# Patient Record
Sex: MALE | Race: WHITE | NOT HISPANIC OR LATINO | Employment: FULL TIME | URBAN - METROPOLITAN AREA
[De-identification: names, ages, dates, MRNs, and addresses within clinical notes are randomized per-mention and may not be internally consistent; named-entity substitution may affect disease eponyms.]

---

## 2017-04-01 ENCOUNTER — NON-PROVIDER VISIT (OUTPATIENT)
Dept: OCCUPATIONAL MEDICINE | Facility: CLINIC | Age: 32
End: 2017-04-01
Payer: COMMERCIAL

## 2017-04-01 ENCOUNTER — HOSPITAL ENCOUNTER (EMERGENCY)
Facility: MEDICAL CENTER | Age: 32
End: 2017-04-01
Attending: EMERGENCY MEDICINE
Payer: COMMERCIAL

## 2017-04-01 VITALS
WEIGHT: 240 LBS | HEIGHT: 73 IN | DIASTOLIC BLOOD PRESSURE: 85 MMHG | SYSTOLIC BLOOD PRESSURE: 123 MMHG | BODY MASS INDEX: 31.81 KG/M2 | HEART RATE: 89 BPM | RESPIRATION RATE: 16 BRPM | OXYGEN SATURATION: 95 % | TEMPERATURE: 98.5 F

## 2017-04-01 DIAGNOSIS — Z02.1 PRE-EMPLOYMENT DRUG SCREENING: ICD-10-CM

## 2017-04-01 DIAGNOSIS — Z02.83 ENCOUNTER FOR DRUG SCREENING: ICD-10-CM

## 2017-04-01 DIAGNOSIS — T14.8XXA SPLINTER IN SKIN: ICD-10-CM

## 2017-04-01 PROCEDURE — 80305 DRUG TEST PRSMV DIR OPT OBS: CPT | Performed by: INTERNAL MEDICINE

## 2017-04-01 PROCEDURE — 99283 EMERGENCY DEPT VISIT LOW MDM: CPT

## 2017-04-01 PROCEDURE — 82075 ASSAY OF BREATH ETHANOL: CPT | Performed by: INTERNAL MEDICINE

## 2017-04-01 PROCEDURE — 99026 IN-HOSPITAL ON CALL SERVICE: CPT | Performed by: INTERNAL MEDICINE

## 2017-04-01 ASSESSMENT — LIFESTYLE VARIABLES: DO YOU DRINK ALCOHOL: NO

## 2017-04-01 ASSESSMENT — PAIN SCALES - GENERAL: PAINLEVEL_OUTOF10: 2

## 2017-04-01 NOTE — ED NOTES
Pt ambulated to yellow 65.  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  Small black speck noted to cut from off of table.  ERP to see.

## 2017-04-01 NOTE — ED AVS SNAPSHOT
" Home Care Instructions                                                                                                                Mak Miranda   MRN: [id number]    Department:  [facility], Emergency Dept   Date of Visit:  4/1/2017            [facility], Emergency Dept    [address]    [facility] [address]    Phone:  [phone]      You were seen by     Preston Chin M.D.      Your Diagnosis Was     Splinter in skin     T14.8       Follow-up Information     1. Follow up with Fito Matthews M.D. In 1 day.    Specialty:  Family Medicine    Contact information    [address]  [facility] [address] [phone]        Medication Information     Review all of your home medications and newly ordered medications with your primary doctor and/or pharmacist as soon as possible. Follow medication instructions as directed by your doctor and/or pharmacist.     Please keep your complete medication list with you and share with your physician. Update the information when medications are discontinued, doses are changed, or new medications (including over-the-counter products) are added; and carry medication information at all times in the event of emergency situations.               Medication List      ASK your doctor about these medications        Instructions    Morning Afternoon Evening Bedtime    oxycodone immediate-release 5 MG Tabs   Commonly known as:  ROXICODONE        Take 2 Tabs by mouth every 3 hours as needed for Severe Pain.   Dose:  10 mg                                  Discharge Instructions       Foreign Body  When the skin is cut or punctured and some object is left in the tissue under the skin, that object is called a \"foreign body\". A foreign body could be a wood splinter, a thorn, a sliver of metal, a shard of glass, a cactus needle or the tip of a pencil. In most instances, your caregiver will recommend that the foreign body be removed. If it " is not removed, infection, abscess formation, an allergic reaction, chronic pain and disability can occur over time.  Sometimes, foreign bodies (particularly very small ones) can be difficult to locate. Your caregiver may recommend x-rays or ultrasound imaging to help find them. If removal is not successful, there may be a need to see a surgeon who might suggest further exploration in the operating room. Occasionally, tiny bits of metallic foreign material (such as shrapnel) are not removed, if it is felt that there would be no harm in leaving them untouched.  HOME CARE INSTRUCTIONS  · Rest the injured area and keep it elevated until all the pain and swelling are gone.   · You will need a tetanus vaccination if you have not had one in the last 5 years.   · Return to this facility, see your caregiver or follow-up as instructed in 2 days.   SEEK IMMEDIATE MEDICAL CARE IF:   · You develop increasing redness or swelling of the skin near the wound.   · You develop drainage of pus from the wound.   · You have persistent pain or loss of motion.   · You have red streaks extending above or below the wound location.   MAKE SURE YOU:   · Understand these instructions.   · Will watch your condition.   · Will get help right away if you are not doing well or get worse.   Document Released: 12/18/2006 Document Revised: 03/11/2013 Document Reviewed: 11/19/2010  ExitCare® Patient Information ©2013 [employer].          Patient Information     Patient Information    Following emergency treatment: all patient requiring follow-up care must return either to a private physician or a clinic if your condition worsens before you are able to obtain further medical attention, please return to the emergency room.     Billing Information    At [facility], we work to make the billing process streamlined for our patients.  Our Representatives are here to answer any questions you may have regarding your hospital bill.  If you have insurance  coverage and have supplied your insurance information to us, we will submit a claim to your insurer on your behalf.  Should you have any questions regarding your bill, we can be reached online or by phone as follows:  Online: You are able pay your bills online or live chat with our representatives about any billing questions you may have. We are here to help Monday - Friday from 8:00am to 7:30pm and 9:00am - 12:00pm on Saturdays.  Please visit [email or web address]  for more information.   Phone:  [phone] or [phone]    Please note that your emergency physician, surgeon, pathologist, radiologist, anesthesiologist, and other specialists are not employed by [facility] and will therefore bill separately for their services.  Please contact them directly for any questions concerning their bills at the numbers below:     Emergency Physician Services:  [phone]  [facility]:  [phone]  [facility]:  [phone]  [facility]:  [phone]    1. Your final bill may vary from the amount quoted upon discharge if all procedures are not complete at that time, or if your doctor has additional procedures of which we are not aware. You will receive an additional bill if you return to the Emergency Department at [facility] for suture removal regardless of the facility of which the sutures were placed.     2. Please arrange for settlement of this account at the emergency registration.    3. All self-pay accounts are due in full at the time of treatment.  If you are unable to meet this obligation then payment is expected within 4-5 days.     4. If you have had radiology studies (CT, X-ray, Ultrasound, MRI), you have received a preliminary result during your emergency department visit. Please contact the radiology department [phone] to receive a copy of your final result. Please discuss the Final result with your primary  physician or with the follow up physician provided.     Crisis Hotline:  [place] Crisis Hotline:  2-659-BFNMTQW or 1-436.133.3854  [place] Crisis Hotline:    1-460.118.8697 or 237-622-8907         ED Discharge Follow Up Questions    1. In order to provide you with very good care, we would like to follow up with a phone call in the next few days.  May we have your permission to contact you?     YES /  NO    2. What is the best phone number to call you? (       )_____-__________    3. What is the best time to call you?      Morning  /  Afternoon  /  Evening                   Patient Signature:  ____________________________________________________________    Date:  ____________________________________________________________

## 2017-04-01 NOTE — ED NOTES
Discharge instructions given.  All questions answered.  Pt to follow-up with PCP.  Pt verbalized understanding.  All belongings with pt.  Pt ambulated to lobby.

## 2017-04-01 NOTE — LETTER
"  FORM C-4:  EMPLOYEE’S CLAIM FOR COMPENSATION/ REPORT OF INITIAL TREATMENT  EMPLOYEE’S CLAIM - PROVIDE ALL INFORMATION REQUESTED   First Name  Mak Last Name  Ruth Birthdate             Age  1985 31 y.o. 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 (if applicable)  Cleaning the equiptment    How did this injury or occupational disease occur? Be specific and answer in detail. Use additional sheet if necessary)  I was cleaning the footboard of the ct table with gloves and a piece of the table went through my gloves and into my middle finger   If you believe that you have an occupational disease, when did you first have knowledge of the disability and it relationship to your employment?  NA Witnesses to the Accident  NA     Nature of Injury or Occupational Disease  Puncture  Part(s) of Body Injured or Affected  Finger (R), N/A, N/A    I certify that the above is true and correct to the best of my knowledge and that I have provided this information in order to " obtain the benefits of [place]).  I hereby authorize any physician, chiropractor, surgeon, practitioner, or other person, any hospital, including [facility] or [facility] hospital, any medical service organization, any insurance company, or other institution or organization to release to each other, any medical or other information, including benefits paid or payable, pertinent to this injury or disease, except information relative to diagnosis, treatment and/or counseling for AIDS, psychological conditions, alcohol or controlled substances, for which I must give specific authorization.  A Photostat of this authorization shall be as valid as the original.   Date  04/01/2017 [facility] Employee’s Signature   THIS REPORT MUST BE COMPLETED AND MAILED WITHIN 3 WORKING DAYS OF TREATMENT   Place  [facility], EMERGENCY DEPT  Name of Facility   [facility]   Date  4/1/2017 Diagnosis  (T14.8) Splinter in skin Is there evidence the injured employee was under the influence of alcohol and/or another controlled substance at the time of accident?   Hour  1:39 PM Description of Injury or Disease  Splinter in skin No   Treatment  Foreign body removal from skin  Have you advised the patient to remain off work five days or more?         No   X-Ray Findings    Comments:not indicated   If Yes   From Date    To Date      From information given by the employee, together with medical evidence, can you directly connect this injury or occupational disease as job incurred?  Yes If No, is the employee capable of: Full Duty  Yes Modified Duty      Is additional medical care by a physician indicated?  No If Modified Duty, Specify any Limitations / Restrictions        Do you know of any previous injury or disease contributing to this condition or occupational  "disease?  No   Date  4/1/2017 Print Doctor’s Name  Preston Chin certify the employer’s copy of this form was mailed on:   Address  [address] [address] [phone] Insurer’s Use Only   [facility]  [address]    Provider’s Tax ID Number    Telephone  Dept: [phone]    Doctor’s Signature  e-SignPRESTON CHIN M.D. Degree   MD    Original - TREATING PHYSICIAN OR CHIROPRACTOR   Pg 2-Insurer/TPA   Pg 3-Employer   Pg 4-Employee                                                                                                  Form C-4 (rev01/03)     BRIEF DESCRIPTION OF RIGHTS AND BENEFITS  ([place]ursuant to [place] 616[place])    Notice of Injury or Occupational Disease (Incident Report Form C-1): If an injury or occupational disease (OD) arises out of and in the course of employment, you must provide written notice to your employer as soon as practicable, but no later than 7 days after the accident or OD. Your employer shall maintain a sufficient supply of the required forms.    Claim for Compensation (Form C-4): If medical treatment is sought, the form C-4 is available at the place of initial treatment. A completed \"Claim for Compensation\" (Form C-4) must be filed within 90 days after an accident or OD. The treating physician or chiropractor must, within 3 working days after treatment, complete and mail to the employer, the employer's insurer and third-party , the Claim for Compensation.    Medical Treatment: If you require medical treatment for your on-the-job injury or OD, you may be required to select a physician or chiropractor from a list provided by your workers’ compensation insurer, if it has contracted with an Organization for Managed Care (MCO) or Preferred Provider Organization (PPO) or providers of health care. If your employer has not entered into a contract with an MCO or PPO, you may select a physician or chiropractor from the Panel of Physicians and " Chiropractors. Any medical costs related to your industrial injury or OD will be paid by your insurer.    Temporary Total Disability (TTD): If your doctor has certified that you are unable to work for a period of at least 5 consecutive days, or 5 cumulative days in a 20-day period, or places restrictions on you that your employer does not accommodate, you may be entitled to TTD compensation.    Temporary Partial Disability (TPD): If the wage you receive upon reemployment is less than the compensation for TTD to which you are entitled, the insurer may be required to pay you TPD compensation to make up the difference. TPD can only be paid for a maximum of 24 months.    Permanent Partial Disability (PPD): When your medical condition is stable and there is an indication of a PPD as a result of your injury or OD, within 30 days, your insurer must arrange for an evaluation by a rating physician or chiropractor to determine the degree of your PPD. The amount of your PPD award depends on the date of injury, the results of the PPD evaluation and your age and wage.    Permanent Total Disability (PTD): If you are medically certified by a treating physician or chiropractor as permanently and totally disabled and have been granted a PTD status by your insurer, you are entitled to receive monthly benefits not to exceed 66 2/3% of your average monthly wage. The amount of your PTD payments is subject to reduction if you previously received a PPD award.    Vocational Rehabilitation Services: You may be eligible for vocational rehabilitation services if you are unable to return to the job due to a permanent physical impairment or permanent restrictions as a result of your injury or occupational disease.    Transportation and Per Ludwig Reimbursement: You may be eligible for travel expenses and per ludwig associated with medical treatment.  Reopening: You may be able to reopen your claim if your condition worsens after claim  closure.    Appeal Process: If you disagree with a written determination issued by the insurer or the insurer does not respond to your request, you may appeal to the Department of Administration, , by following the instructions contained in your determination letter. You must appeal the determination within 70 days from the date of the determination letter at [address], [address], [place], [place] [address], or [address], [address], [place], [place] [address]. If you disagree with the  decision, you may appeal to the Department of Administration, . You must file your appeal within 30 days from the date of the  decision letter at [address], [address], [place], [place] [address], or [address], [place], [place] [address]. If you disagree with a decision of an , you may file a petition for judicial review with the District Court. You must do so within 30 days of the Appeal Officer’s decision. You may be represented by an  at your own expense or you may contact the [facility] for possible representation.    [place]  for Injured Workers (NAIW): If you disagree with a  decision, you may request that NAIW represent you without charge at an  Hearing. For information regarding denial of benefits, you may contact the [facility] at: [address], [address], [place] [address], [phone], or [address], [address], [place] [address], [phone]    To File a Complaint with the Division: If you wish to file a complaint with the  of the Division of Industrial Relations (DIR), please contact the Workers’ Compensation Section, [address], [address], [place], [place] [address], telephone [phone], or [address], [place] [address], telephone [phone].    For assistance with  Workers’ Compensation Issues: you may contact the Office of the Governor Consumer Health Assistance, [address] [place] [address], [place] [address], [place] [address], Toll Free 1-838.713.7767, Web site: http://govcha.[facility].nv., E-mail [email or web address]@[email or web address] [email or web address].[email or web address].                                                                                                                                                                               __________________________________________________________________                                    _________________            Employee Name / Signature                                                                                                                            Date                                       D-2 (rev. 10/07)

## 2017-04-01 NOTE — ED PROVIDER NOTES
"ED Provider Note    CHIEF COMPLAINT  Chief Complaint   Patient presents with   • Work-Related Injury     small cut to right hand middle finger       HPI  Mak Miranda is a 31 y.o. 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 No fever, no numbness.    REVIEW OF SYSTEMS  Negative for fever, numbness.    PAST MEDICAL HISTORY   has a past medical history of Anxiety; Hypertension; Pain; Bipolar 1 disorder, mixed (CMS-HCC) (7/1/2014); Oppositional defiant disorder; and Arthritis.    SOCIAL HISTORY  Social History     Social History Main Topics   • Smoking status: Never Smoker    • Smokeless tobacco: Never Used      Comment: Pipe once a month   • Alcohol Use: 0.6 oz/week     1 Cans of beer per week      Comment: 1 drink per week   • Drug Use: Yes     Special: Marijuana      Comment: tablets, medical THC   • Sexual Activity:     Partners: Female       SURGICAL HISTORY   has past surgical history that includes arthroscopy, knee (1992); other orthopedic surgery (2005); and knee arthroscopy (Left, 11/23/2016).    CURRENT MEDICATIONS  I personally reviewed the medication list in the charting documentation.     ALLERGIES  Allergies   Allergen Reactions   • Sulfa Drugs      As infant; doesn't remember rxn       PHYSICAL EXAM  VITAL SIGNS: /84 mmHg  Pulse 96  Temp(Src) 36.2 °C (97.1 °F)  Resp 16  Ht 1.854 m (6' 0.99\")  Wt 108.863 kg (240 lb)  BMI 31.67 kg/m2  SpO2 95%  Constitutional: Alert in no apparent distress.  HENT: No signs of trauma.   Eyes: Conjunctiva normal, Non-icteric.   Chest: Normal nonlabored respirations  Skin: Right middle finger has a small sliver of a black foreign body, no obvious laceration, no erythema  Musculoskeletal: Good range of motion in all major joints.   Neurologic: Alert, No focal deficits noted.   Psychiatric: Affect normal, " Judgment normal.    COURSE & MEDICAL DECISION MAKING  Pertinent Labs & Imaging studies reviewed. (See chart for details)    Encounter Summary: This is a 31 y.o. male with body under the skin of his finger, this was removed in emergency department, his tetanus is up-to-date. Stable for discharge      DISPOSITION: Discharge Home      FINAL IMPRESSION  1. Splinter in skin        This dictation was created using voice recognition software. The accuracy of the dictation is limited to the abilities of the software. I expect there may be some errors of grammar and possibly content. The nursing notes were reviewed and certain aspects of this information were incorporated into this note.    Electronically signed by: Preston Chin, 4/1/2017 1:39 PM

## 2017-04-01 NOTE — ED AVS SNAPSHOT
4/1/2017          Mak Miranda  [address] [place] [address]    Dear Mak:    [facility] wants to ensure your discharge home is safe and you or your loved ones have had all your questions answered regarding your care after you leave the hospital.    You may receive a telephone call within two days of your discharge.  This call is to make certain you understand your discharge instructions as well as ensure we provided you with the best care possible during your stay with us.     The call will only last approximately 3-5 minutes and will be done by a nurse.    Once again, we want to ensure your discharge home is safe and that you have a clear understanding of any next steps in your care.  If you have any questions or concerns, please do not hesitate to contact us, we are here for you.  Thank you for choosing [facility] for your healthcare needs.    Sincerely,    Asim Fry    [facility]

## 2017-04-01 NOTE — MR AVS SNAPSHOT
Mak Miranda   2017 2:20 PM   Appointment   MRN: [id number]    Department:  [facility]   Dept Phone:  [phone]    Description:  Male : 1985   Provider:  [place] NON RENOWN MA           Allergies as of 2017     Allergen Noted Reactions    Sulfa Drugs 2014       As infant; doesn't remember rxn      Vital Signs     Smoking Status                   Never Smoker            Basic Information     Date Of Birth Sex Race Ethnicity Preferred Language    1985 Male White Non- English      Problem List              ICD-10-CM Priority Class Noted - Resolved    GERD (gastroesophageal reflux disease) K21.9   2014 - Present    Hyperlipidemia E78.5   2014 - Present    Obesity (BMI 30.0-34.9) E66.9   2014 - Present    Right elbow pain M25.521   10/6/2015 - Present    Epigastric pain R10.13   2016 - Present    Chronic fatigue R53.82   2016 - Present    Generalized anxiety disorder F41.1   2016 - Present    CLARE (generalized anxiety disorder) F41.1   10/4/2016 - Present    Obsessive compulsive disorder F42.9   10/4/2016 - Present    Left knee pain M25.562   2016 - Present      Health Maintenance        Date Due Completion Dates    IMM DTaP/Tdap/Td Vaccine (1 - Tdap) 6/3/2004 ---            Current Immunizations     Influenza Vaccine Quad Inj (Preserved) 2015, 2014    MMR Vaccine 1986    Tuberculin Skin Test 2014 12:25 PM, 2014 10:27 AM    Varicella Vaccine Live 5/15/1991      Below and/or attached are the medications your provider expects you to take. Review all of your home medications and newly ordered medications with your provider and/or pharmacist. Follow medication instructions as directed by your provider and/or pharmacist. Please keep your medication list with you and share with your provider. Update the information when medications are discontinued, doses are changed, or new medications (including over-the-counter  products) are added; and carry medication information at all times in the event of emergency situations     Allergies:  SULFA DRUGS - (reactions not documented)               Medications  Valid as of: April 05, 2017 - 10:20 AM    Generic Name Brand Name Tablet Size Instructions for use    OxyCODONE HCl (Tab) ROXICODONE 5 MG Take 2 Tabs by mouth every 3 hours as needed for Severe Pain.        .                 Medicines prescribed today were sent to:     [facility][id number] - [place] - [address] [place] [address]    Phone: [phone] Fax: [phone]    Open 24 Hours?: No      Medication refill instructions:       If your prescription bottle indicates you have medication refills left, it is not necessary to call your provider’s office. Please contact your pharmacy and they will refill your medication.    If your prescription bottle indicates you do not have any refills left, you may request refills at any time through one of the following ways: The online GoFormz system (except Urgent Care), by calling your provider’s office, or by asking your pharmacy to contact your provider’s office with a refill request. Medication refills are processed only during regular business hours and may not be available until the next business day. Your provider may request additional information or to have a follow-up visit with you prior to refilling your medication.   *Please Note: Medication refills are assigned a new Rx number when refilled electronically. Your pharmacy may indicate that no refills were authorized even though a new prescription for the same medication is available at the pharmacy. Please request the medicine by name with the pharmacy before contacting your provider for a refill.           GoFormz Access Code: Activation code not generated  Current GoFormz Status: Active

## 2017-04-01 NOTE — ED AVS SNAPSHOT
JavaJobs Access Code: Activation code not generated  Current JavaJobs Status: Active    Uzabasehart  A secure, online tool to manage your health information     [employer]’s JavaJobs® is a secure, online tool that connects you to your personalized health information from the privacy of your home -- day or night - making it very easy for you to manage your healthcare. Once the activation process is completed, you can even access your medical information using the JavaJobs ernie, which is available for free in the Apple Ernie store or Google Play store.     JavaJobs provides the following levels of access (as shown below):   My Chart Features   [facility] Primary Care Doctor [facility]  Specialists [facility]  Urgent  Care [facility]  Primary Care  Doctor   Email your healthcare team securely and privately 24/7 X X X X   Manage appointments: schedule your next appointment; view details of past/upcoming appointments X      Request prescription refills. X      View recent personal medical records, including lab and immunizations X X X X   View health record, including health history, allergies, medications X X X X   Read reports about your outpatient visits, procedures, consult and ER notes X X X X   See your discharge summary, which is a recap of your hospital and/or ER visit that includes your diagnosis, lab results, and care plan. X X       How to register for JavaJobs:  1. Go to  [email or web address].  2. Click on the Sign Up Now box, which takes you to the New Member Sign Up page. You will need to provide the following information:  a. Enter your JavaJobs Access Code exactly as it appears at the top of this page. (You will not need to use this code after you’ve completed the sign-up process. If you do not sign up before the expiration date, you must request a new code.)   b. Enter your date of birth.   c. Enter your home email address.   d. Click Submit, and follow the next screen’s instructions.  3. Create a JavaJobs ID. This will  be your [employer] login ID and cannot be changed, so think of one that is secure and easy to remember.  4. Create a Cobook password. You can change your password at any time.  5. Enter your Password Reset Question and Answer. This can be used at a later time if you forget your password.   6. Enter your e-mail address. This allows you to receive e-mail notifications when new information is available in Cobook.  7. Click Sign Up. You can now view your health information.    For assistance activating your Cobook account, call [phone]

## 2017-04-05 LAB
AMP AMPHETAMINE: NORMAL
BAR BARBITURATES: NORMAL
BREATH ALCOHOL COMMENT: NORMAL
BZO BENZODIAZEPINES: NORMAL
COC COCAINE: NORMAL
INT CON NEG: NORMAL
INT CON POS: NORMAL
MDMA ECSTASY: NORMAL
MET METHAMPHETAMINES: NORMAL
MTD METHADONE: NORMAL
OPI OPIATES: NORMAL
OXY OXYCODONE: NORMAL
PCP PHENCYCLIDINE: NORMAL
POC BREATHALIZER: 0 PERCENT (ref 0–0.01)
POC URINE DRUG SCREEN OCDRS: NEGATIVE
THC: NORMAL

## 2017-04-05 NOTE — PROGRESS NOTES
Elidia was called out After business hours to ER for a Post Accident UDS and BAT on 4/1/17 for [facility].    UDS collected at 215pm.  BAT collected at 209pm.

## 2017-05-05 ENCOUNTER — HOSPITAL ENCOUNTER (EMERGENCY)
Facility: MEDICAL CENTER | Age: 32
End: 2017-05-05
Attending: EMERGENCY MEDICINE
Payer: COMMERCIAL

## 2017-05-05 ENCOUNTER — PATIENT MESSAGE (OUTPATIENT)
Dept: MEDICAL GROUP | Facility: MEDICAL CENTER | Age: 32
End: 2017-05-05

## 2017-05-05 ENCOUNTER — TELEPHONE (OUTPATIENT)
Dept: MEDICAL GROUP | Facility: MEDICAL CENTER | Age: 32
End: 2017-05-05

## 2017-05-05 VITALS
SYSTOLIC BLOOD PRESSURE: 131 MMHG | TEMPERATURE: 97.8 F | HEIGHT: 73 IN | BODY MASS INDEX: 32.9 KG/M2 | HEART RATE: 84 BPM | WEIGHT: 248.24 LBS | DIASTOLIC BLOOD PRESSURE: 86 MMHG | RESPIRATION RATE: 17 BRPM | OXYGEN SATURATION: 98 %

## 2017-05-05 DIAGNOSIS — S01.81XA LACERATION OF FACE, INITIAL ENCOUNTER: ICD-10-CM

## 2017-05-05 PROCEDURE — 90471 IMMUNIZATION ADMIN: CPT

## 2017-05-05 PROCEDURE — 700101 HCHG RX REV CODE 250: Performed by: EMERGENCY MEDICINE

## 2017-05-05 PROCEDURE — 700111 HCHG RX REV CODE 636 W/ 250 OVERRIDE (IP): Performed by: EMERGENCY MEDICINE

## 2017-05-05 PROCEDURE — 90715 TDAP VACCINE 7 YRS/> IM: CPT | Performed by: EMERGENCY MEDICINE

## 2017-05-05 PROCEDURE — 99283 EMERGENCY DEPT VISIT LOW MDM: CPT

## 2017-05-05 PROCEDURE — A9270 NON-COVERED ITEM OR SERVICE: HCPCS | Performed by: EMERGENCY MEDICINE

## 2017-05-05 PROCEDURE — 700102 HCHG RX REV CODE 250 W/ 637 OVERRIDE(OP): Performed by: EMERGENCY MEDICINE

## 2017-05-05 PROCEDURE — 304217 HCHG IRRIGATION SYSTEM

## 2017-05-05 PROCEDURE — 303747 HCHG EXTRA SUTURE

## 2017-05-05 PROCEDURE — 304999 HCHG REPAIR-SIMPLE/INTERMED LEVEL 1

## 2017-05-05 RX ORDER — LIDOCAINE HYDROCHLORIDE 10 MG/ML
20 INJECTION, SOLUTION INFILTRATION; PERINEURAL ONCE
Status: COMPLETED | OUTPATIENT
Start: 2017-05-05 | End: 2017-05-05

## 2017-05-05 RX ORDER — TRAMADOL HYDROCHLORIDE 50 MG/1
100 TABLET ORAL ONCE
Status: COMPLETED | OUTPATIENT
Start: 2017-05-05 | End: 2017-05-05

## 2017-05-05 RX ADMIN — TRAMADOL HYDROCHLORIDE 100 MG: 50 TABLET, COATED ORAL at 02:42

## 2017-05-05 RX ADMIN — CLOSTRIDIUM TETANI TOXOID ANTIGEN (FORMALDEHYDE INACTIVATED), CORYNEBACTERIUM DIPHTHERIAE TOXOID ANTIGEN (FORMALDEHYDE INACTIVATED), BORDETELLA PERTUSSIS TOXOID ANTIGEN (GLUTARALDEHYDE INACTIVATED), BORDETELLA PERTUSSIS FILAMENTOUS HEMAGGLUTININ ANTIGEN (FORMALDEHYDE INACTIVATED), BORDETELLA PERTUSSIS PERTACTIN ANTIGEN, AND BORDETELLA PERTUSSIS FIMBRIAE 2/3 ANTIGEN 0.5 ML: 5; 2; 2.5; 5; 3; 5 INJECTION, SUSPENSION INTRAMUSCULAR at 02:25

## 2017-05-05 RX ADMIN — LIDOCAINE HYDROCHLORIDE 20 ML: 10 INJECTION, SOLUTION INFILTRATION; PERINEURAL at 01:08

## 2017-05-05 ASSESSMENT — PAIN SCALES - GENERAL: PAINLEVEL_OUTOF10: 6

## 2017-05-05 NOTE — ED AVS SNAPSHOT
[employer] Access Code: Activation code not generated  Current [employer] Status: Active    E-[employer]K Dynamicshart  A secure, online tool to manage your health information     [employer]’s [employer]® is a secure, online tool that connects you to your personalized health information from the privacy of your home -- day or night - making it very easy for you to manage your healthcare. Once the activation process is completed, you can even access your medical information using the [employer] ernie, which is available for free in the Apple Ernie store or Google Play store.     [employer] provides the following levels of access (as shown below):   My Chart Features   [facility] Primary Care Doctor [facility]  Specialists [facility]  Urgent  Care Non-[facility]  Primary Care  Doctor   Email your healthcare team securely and privately 24/7 X X X X   Manage appointments: schedule your next appointment; view details of past/upcoming appointments X      Request prescription refills. X      View recent personal medical records, including lab and immunizations X X X X   View health record, including health history, allergies, medications X X X X   Read reports about your outpatient visits, procedures, consult and ER notes X X X X   See your discharge summary, which is a recap of your hospital and/or ER visit that includes your diagnosis, lab results, and care plan. X X       How to register for [employer]:  1. Go to  [email or web address].  2. Click on the Sign Up Now box, which takes you to the New Member Sign Up page. You will need to provide the following information:  a. Enter your [employer] Access Code exactly as it appears at the top of this page. (You will not need to use this code after you’ve completed the sign-up process. If you do not sign up before the expiration date, you must request a new code.)   b. Enter your date of birth.   c. Enter your home email address.   d. Click Submit, and follow the next screen’s instructions.  3. Create a [employer] ID. This will  be your "[employer]" login ID and cannot be changed, so think of one that is secure and easy to remember.  4. Create a "[employer]" password. You can change your password at any time.  5. Enter your Password Reset Question and Answer. This can be used at a later time if you forget your password.   6. Enter your e-mail address. This allows you to receive e-mail notifications when new information is available in "[employer]".  7. Click Sign Up. You can now view your health information.    For assistance activating your "[employer]" account, call [phone]

## 2017-05-05 NOTE — TELEPHONE ENCOUNTER
For a fall and suture placement, narcotic pain medication is not indicated or recommended.  Patient can use ibuprofen 800 mg up to 3 times daily and then alternate that with Tylenol 1000 mg up to 3 times daily. He can also use topical ice.  Pain should improve in next few days.  Fito Matthews M.D.

## 2017-05-05 NOTE — ED AVS SNAPSHOT
5/5/2017    Mak Miranda  [address] [facility] [address] [facility] [address]    Dear Mak:    [facility] wants to ensure your discharge home is safe and you or your loved ones have had all of your questions answered regarding your care after you leave the hospital.    Below is a list of resources and contact information should you have any questions regarding your hospital stay, follow-up instructions, or active medical symptoms.    Questions or Concerns Regarding… Contact   Medical Questions Related to Your Discharge  (7 days a week, 8am-5pm) Contact a Nurse Care Coordinator   [phone]   Medical Questions Not Related to Your Discharge  (24 hours a day / 7 days a week)  Contact the Nurse Health Line   [phone]    Medications or Discharge Instructions Refer to your discharge packet   or contact your [facility] Primary Care Provider   [phone]   Follow-up Appointment(s) Schedule your appointment via [employer]   or contact Scheduling [phone]   Billing Review your statement via [employer]  or contact Billing [phone]   Medical Records Review your records via [employer]   or contact Medical Records [phone]     You may receive a telephone call within two days of discharge. This call is to make certain you understand your discharge instructions and have the opportunity to have any questions answered. You can also easily access your medical information, test results and upcoming appointments via the [employer] free online health management tool. You can learn more and sign up at Spayee/[employer]. For assistance setting up your [employer] account, please call [phone].    Once again, we want to ensure your discharge home is safe and that you have a clear understanding of any next steps in your care. If you have any questions or concerns, please do not hesitate to contact us, we are here for you. Thank you for choosing [facility] for your healthcare needs.    Sincerely,    Your [facility] Healthcare Team

## 2017-05-05 NOTE — DISCHARGE INSTRUCTIONS
Facial Laceration   A facial laceration is a cut on the face. These injuries can be painful and cause bleeding. Lacerations usually heal quickly, but they need special care to reduce scarring.  DIAGNOSIS   Your health care provider will take a medical history, ask for details about how the injury occurred, and examine the wound to determine how deep the cut is.  TREATMENT   Some facial lacerations may not require closure. Others may not be able to be closed because of an increased risk of infection. The risk of infection and the chance for successful closure will depend on various factors, including the amount of time since the injury occurred.  The wound may be cleaned to help prevent infection. If closure is appropriate, pain medicines may be given if needed. Your health care provider will use stitches (sutures), wound glue (adhesive), or skin adhesive strips to repair the laceration. These tools bring the skin edges together to allow for faster healing and a better cosmetic outcome. If needed, you may also be given a tetanus shot.  HOME CARE INSTRUCTIONS  · Only take over-the-counter or prescription medicines as directed by your health care provider.  · Follow your health care provider's instructions for wound care. These instructions will vary depending on the technique used for closing the wound.  For Sutures:  · Keep the wound clean and dry.    · If you were given a bandage (dressing), you should change it at least once a day. Also change the dressing if it becomes wet or dirty, or as directed by your health care provider.    · Wash the wound with soap and water 2 times a day. Rinse the wound off with water to remove all soap. Pat the wound dry with a clean towel.    · After cleaning, apply a thin layer of the antibiotic ointment recommended by your health care provider. This will help prevent infection and keep the dressing from sticking.    · You may shower as usual after the first 24 hours. Do not soak the  wound in water until the sutures are removed.    · Get your sutures removed as directed by your health care provider. With facial lacerations, sutures should usually be taken out after 4-5 days to avoid stitch marks.    · Wait a few days after your sutures are removed before applying any makeup.  For Skin Adhesive Strips:  · Keep the wound clean and dry.    · Do not get the skin adhesive strips wet. You may bathe carefully, using caution to keep the wound dry.    · If the wound gets wet, pat it dry with a clean towel.    · Skin adhesive strips will fall off on their own. You may trim the strips as the wound heals. Do not remove skin adhesive strips that are still stuck to the wound. They will fall off in time.    For Wound Adhesive:  · You may briefly wet your wound in the shower or bath. Do not soak or scrub the wound. Do not swim. Avoid periods of heavy sweating until the skin adhesive has fallen off on its own. After showering or bathing, gently pat the wound dry with a clean towel.    · Do not apply liquid medicine, cream medicine, ointment medicine, or makeup to your wound while the skin adhesive is in place. This may loosen the film before your wound is healed.    · If a dressing is placed over the wound, be careful not to apply tape directly over the skin adhesive. This may cause the adhesive to be pulled off before the wound is healed.    · Avoid prolonged exposure to sunlight or tanning lamps while the skin adhesive is in place.  · The skin adhesive will usually remain in place for 5-10 days, then naturally fall off the skin. Do not pick at the adhesive film.    After Healing:  Once the wound has healed, cover the wound with sunscreen during the day for 1 full year. This can help minimize scarring. Exposure to ultraviolet light in the first year will darken the scar. It can take 1-2 years for the scar to lose its redness and to heal completely.   SEEK MEDICAL CARE IF:  · You have a fever.  SEEK IMMEDIATE  MEDICAL CARE IF:  · You have redness, pain, or swelling around the wound.    · You see a yellowish-white fluid (pus) coming from the wound.       This information is not intended to replace advice given to you by your health care provider. Make sure you discuss any questions you have with your health care provider.     Document Released: 01/25/2006 Document Revised: 01/08/2016 Document Reviewed: 07/31/2014  [employer] Interactive Patient Education ©2016 [employer]

## 2017-05-05 NOTE — ED AVS SNAPSHOT
Home Care Instructions                                                                                                                Mak Miranda   MRN: [id number]    Department:  [facility], Emergency Dept   Date of Visit:  5/5/2017            [facility], Emergency Dept    [address] [place] [address]    Phone:  [phone]      You were seen by     Natanael Thompson M.D.      Your Diagnosis Was     Laceration of face, initial encounter     S01.81XA       These are the medications you received during your hospitalization from 05/05/2017 0053 to 05/05/2017 0230     Date/Time Order Dose Route Action    05/05/2017 0225 tetanus-dipth-acell pertussis (ADACEL) inj 0.5 mL 0.5 mL Intramuscular Given      Follow-up Information     1. Follow up with [facility], Emergency Dept In 5 days.    Specialty:  Emergency Medicine    Why:  For suture removal, For wound re-check    Contact information    [address] [place] [id number] [phone]        2. Follow up with [facility], Emergency Dept.    Specialty:  Emergency Medicine    Why:  if symptoms persist    Contact information    [address] [place] [address] [phone]      Medication Information     Review all of your home medications and newly ordered medications with your primary doctor and/or pharmacist as soon as possible. Follow medication instructions as directed by your doctor and/or pharmacist.     Please keep your complete medication list with you and share with your physician. Update the information when medications are discontinued, doses are changed, or new medications (including over-the-counter products) are added; and carry medication information at all times in the event of emergency situations.               Medication List      ASK your doctor about these medications        Instructions    Morning Afternoon Evening Bedtime    oxycodone  immediate-release 5 MG Tabs   Commonly known as:  ROXICODONE        Take 2 Tabs by mouth every 3 hours as needed for Severe Pain.   Dose:  10 mg                                  Discharge Instructions       Facial Laceration   A facial laceration is a cut on the face. These injuries can be painful and cause bleeding. Lacerations usually heal quickly, but they need special care to reduce scarring.  DIAGNOSIS   Your health care provider will take a medical history, ask for details about how the injury occurred, and examine the wound to determine how deep the cut is.  TREATMENT   Some facial lacerations may not require closure. Others may not be able to be closed because of an increased risk of infection. The risk of infection and the chance for successful closure will depend on various factors, including the amount of time since the injury occurred.  The wound may be cleaned to help prevent infection. If closure is appropriate, pain medicines may be given if needed. Your health care provider will use stitches (sutures), wound glue (adhesive), or skin adhesive strips to repair the laceration. These tools bring the skin edges together to allow for faster healing and a better cosmetic outcome. If needed, you may also be given a tetanus shot.  HOME CARE INSTRUCTIONS  · Only take over-the-counter or prescription medicines as directed by your health care provider.  · Follow your health care provider's instructions for wound care. These instructions will vary depending on the technique used for closing the wound.  For Sutures:  · Keep the wound clean and dry.    · If you were given a bandage (dressing), you should change it at least once a day. Also change the dressing if it becomes wet or dirty, or as directed by your health care provider.    · Wash the wound with soap and water 2 times a day. Rinse the wound off with water to remove all soap. Pat the wound dry with a clean towel.    · After cleaning, apply a thin layer of  the antibiotic ointment recommended by your health care provider. This will help prevent infection and keep the dressing from sticking.    · You may shower as usual after the first 24 hours. Do not soak the wound in water until the sutures are removed.    · Get your sutures removed as directed by your health care provider. With facial lacerations, sutures should usually be taken out after 4-5 days to avoid stitch marks.    · Wait a few days after your sutures are removed before applying any makeup.  For Skin Adhesive Strips:  · Keep the wound clean and dry.    · Do not get the skin adhesive strips wet. You may bathe carefully, using caution to keep the wound dry.    · If the wound gets wet, pat it dry with a clean towel.    · Skin adhesive strips will fall off on their own. You may trim the strips as the wound heals. Do not remove skin adhesive strips that are still stuck to the wound. They will fall off in time.    For Wound Adhesive:  · You may briefly wet your wound in the shower or bath. Do not soak or scrub the wound. Do not swim. Avoid periods of heavy sweating until the skin adhesive has fallen off on its own. After showering or bathing, gently pat the wound dry with a clean towel.    · Do not apply liquid medicine, cream medicine, ointment medicine, or makeup to your wound while the skin adhesive is in place. This may loosen the film before your wound is healed.    · If a dressing is placed over the wound, be careful not to apply tape directly over the skin adhesive. This may cause the adhesive to be pulled off before the wound is healed.    · Avoid prolonged exposure to sunlight or tanning lamps while the skin adhesive is in place.  · The skin adhesive will usually remain in place for 5-10 days, then naturally fall off the skin. Do not pick at the adhesive film.    After Healing:  Once the wound has healed, cover the wound with sunscreen during the day for 1 full year. This can help minimize scarring.  Exposure to ultraviolet light in the first year will darken the scar. It can take 1-2 years for the scar to lose its redness and to heal completely.   SEEK MEDICAL CARE IF:  · You have a fever.  SEEK IMMEDIATE MEDICAL CARE IF:  · You have redness, pain, or swelling around the wound.    · You see a yellowish-white fluid (pus) coming from the wound.       This information is not intended to replace advice given to you by your health care provider. Make sure you discuss any questions you have with your health care provider.     Document Released: 01/25/2006 Document Revised: 01/08/2016 Document Reviewed: 07/31/2014  [employer] Interactive Patient Education ©2016 [employer]            Patient Information     Patient Information    Following emergency treatment: all patient requiring follow-up care must return either to a private physician or a clinic if your condition worsens before you are able to obtain further medical attention, please return to the emergency room.     Billing Information    At [facility], we work to make the billing process streamlined for our patients.  Our Representatives are here to answer any questions you may have regarding your hospital bill.  If you have insurance coverage and have supplied your insurance information to us, we will submit a claim to your insurer on your behalf.  Should you have any questions regarding your bill, we can be reached online or by phone as follows:  Online: You are able pay your bills online or live chat with our representatives about any billing questions you may have. We are here to help Monday - Friday from 8:00am to 7:30pm and 9:00am - 12:00pm on Saturdays.  Please visit [email or web address]  for more information.   Phone:  [phone] or [phone]    Please note that your emergency physician, surgeon, pathologist, radiologist, anesthesiologist, and other specialists are not employed by [facility] and will therefore bill  separately for their services.  Please contact them directly for any questions concerning their bills at the numbers below:     Emergency Physician Services:  [phone]  [facility]:  [phone]  [facility]:  [phone]  [facility]:  [phone]    1. Your final bill may vary from the amount quoted upon discharge if all procedures are not complete at that time, or if your doctor has additional procedures of which we are not aware. You will receive an additional bill if you return to the Emergency Department at [facility] for suture removal regardless of the facility of which the sutures were placed.     2. Please arrange for settlement of this account at the emergency registration.    3. All self-pay accounts are due in full at the time of treatment.  If you are unable to meet this obligation then payment is expected within 4-5 days.     4. If you have had radiology studies (CT, X-ray, Ultrasound, MRI), you have received a preliminary result during your emergency department visit. Please contact the radiology department [phone] to receive a copy of your final result. Please discuss the Final result with your primary physician or with the follow up physician provided.     Crisis Hotline:  [place] Crisis Hotline:  9-855-RYSEKDA or 1-191.996.4811  [place] Crisis Hotline:    1-307.117.1735 or 864-448-5780         ED Discharge Follow Up Questions    1. In order to provide you with very good care, we would like to follow up with a phone call in the next few days.  May we have your permission to contact you?     YES /  NO    2. What is the best phone number to call you? (       )_____-__________    3. What is the best time to call you?      Morning  /  Afternoon  /  Evening                   Patient Signature:  ____________________________________________________________    Date:  ____________________________________________________________

## 2017-05-05 NOTE — ED PROVIDER NOTES
ED Provider Note    Scribed for Natanael Thompson M.D. by Kev Harmon. 5/5/2017, 1:09 AM.    Primary care provider: Fito Matthews M.D.  Means of arrival: Walk-in  History obtained from: Patient  History limited by: None    CHIEF COMPLAINT  Chief Complaint   Patient presents with   • T-5000 Lacerations       HPI  Mak Miranda is a 31 y.o. male who presents to the Emergency Department with a laceration to the right side of his face. Patient reports he tripped over his [employer] this evening and fell forward, striking the right side of his face on the corner of a night stand. He had bleeding from this laceration initially but arrived with it well controlled. Patient now complains of associated pain to the right side of his face. He denies loss of consciousness, confusion, nausea, vomiting, headache, no change of vision or pain with extraocular movements or other symptoms.     REVIEW OF SYSTEMS  Pertinent positives include ground level fall, laceration, facial pain. Pertinent negatives include no loss of consciousness, confusion, nausea, vomiting, headache. E.    PAST MEDICAL HISTORY   has a past medical history of Anxiety; Hypertension; Pain; Bipolar 1 disorder, mixed (CMS-HCC) (7/1/2014); Oppositional defiant disorder; and Arthritis.    SURGICAL HISTORY   has past surgical history that includes arthroscopy, knee (1992); other orthopedic surgery (2005); and knee arthroscopy (Left, 11/23/2016).    SOCIAL HISTORY  Social History   Substance Use Topics   • Smoking status: Never Smoker    • Smokeless tobacco: Never Used      Comment: Pipe once a month   • Alcohol Use: 0.6 oz/week     1 Cans of beer per week      Comment: 1 drink per week      History   Drug Use   • Yes   • Special: Marijuana     Comment: tablets, medical THC       FAMILY HISTORY  Non-contributory    CURRENT MEDICATIONS  Reviewed.  See Encounter Summary.     ALLERGIES  Allergies   Allergen Reactions   • Sulfa Drugs      As infant; doesn't  "remember rxn       PHYSICAL EXAM  VITAL SIGNS: /78 mmHg  Pulse 101  Temp(Src) 36.6 °C (97.8 °F)  Resp 16  Ht 1.854 m (6' 1\")  Wt 112.6 kg (248 lb 3.8 oz)  BMI 32.76 kg/m2  SpO2 94%  Constitutional: Alert and oriented x 3, minimal distress.  HENT: Head is normocephalic. There is a 2 cm stellate laceration at the right-sided nasolabial fold, with minimal surrounding edema and ecchymosis no active bleeding Bilateral external ears normal. Nose normal.   Eyes: Pupils are equal and reactive. Conjunctiva normal, non-icteric.   Heart: Regular rate and rhythm, equal pulses peripherally x 4.    Lungs: Clear to auscultation bilaterally, no wheezes rales or rhonchi  Skin: See HENT. Warm, Dry, No erythema, No rash.   Neurologic: Cranial nerves III through XII grossly intact no sensory deficit no cerebellar dysfunction.   Psychiatric: Appropriate affect for situation    Laceration Repair Procedure Note    Indication: Laceration    Procedure: The patient was placed in the appropriate position and anesthesia around the laceration was obtained by infiltration using 1% Lidocaine without epinephrine. The area was then irrigated with high pressure normal saline. The laceration was closed with 5-0 Eithilon and 5-0 vicryl using 2 deep interrupted sutures with Vicryl and 7 interrupted superficial with Ethilon . There were no additional lacerations requiring repair. The wound area was then dressed with a bandage.      Total repaired wound length: 2 cm.     Other Items: Suture count: 9    The patient tolerated the procedure well.    Complications: None      COURSE & MEDICAL DECISION MAKING  Nursing notes, VS, PMSFHx reviewed in chart.    1:09 AM - Patient seen and examined at bedside. I explained to the patient I will suture his laceration prior to discharge. TDAP updated    1:48 AM Began laceration repair. See procedure note above for further details. We discussed that he will be discharged with instructions to keep the suture " site clean, and to wash it multiple times daily with soap and water.  I advised him to follow-up in five days for suture removal and reevaluation, and to return to the [facility] ED with any new or worsening symptoms, including fevers or swelling at the suture site.           The patient will return for new or worsening symptoms and is stable at the time of discharge.    The patient is referred to a primary physician for blood pressure management, diabetic screening, and for all other preventative health concerns.    DISPOSITION:  Patient will be discharged home in stable condition.    FOLLOW UP:  [facility], Emergency Dept  [address] [place] [id number] [phone]  In 5 days  For suture removal, For wound re-check    [facility], Emergency Dept  [address] [place] [address] [phone]    if symptoms persist        FINAL IMPRESSION  1. Laceration of face, initial encounter         This dictation has been created using voice recognition software and/or scribes. The accuracy of the dictation is limited by the abilities of the software and the expertise of the scribes. I expect there may be some errors of grammar and possibly content. I made every attempt to manually correct the errors within my dictation. However, errors related to voice recognition software and/or scribes may still exist and should be interpreted within the appropriate context.     Kev SAM (Scribe), am scribing for, and in the presence of, Natanael Thompson M.D..    Electronically signed by: Kev Harmon (Scribe), 5/5/2017    Natanael SAM M.D. personally performed the services described in this documentation, as scribed by Kev Harmon in my presence, and it is both accurate and complete.    The note accurately reflects work and decisions made by me.  Natanael Thompson  5/5/2017  5:10 AM

## 2017-05-05 NOTE — ED NOTES
Pt discharged home. Explained discharge instruction. Pt verbalized understanding of instructions. Pt advised to follow-up for suture removal. Pt advised to return to ED if symptoms worsen. Pt is ambulating well and steady on feet. VS stable. Pt's significant other is at bedside and will be driving pt home.

## 2017-05-07 ENCOUNTER — PATIENT OUTREACH (OUTPATIENT)
Dept: HEALTH INFORMATION MANAGEMENT | Facility: OTHER | Age: 32
End: 2017-05-07

## 2017-06-27 ENCOUNTER — HOSPITAL ENCOUNTER (OUTPATIENT)
Dept: LAB | Facility: MEDICAL CENTER | Age: 32
End: 2017-06-27
Attending: FAMILY MEDICINE
Payer: COMMERCIAL

## 2017-06-27 ENCOUNTER — OFFICE VISIT (OUTPATIENT)
Dept: MEDICAL GROUP | Facility: MEDICAL CENTER | Age: 32
End: 2017-06-27
Payer: COMMERCIAL

## 2017-06-27 VITALS
SYSTOLIC BLOOD PRESSURE: 122 MMHG | OXYGEN SATURATION: 95 % | RESPIRATION RATE: 16 BRPM | BODY MASS INDEX: 32.6 KG/M2 | WEIGHT: 246 LBS | HEIGHT: 73 IN | TEMPERATURE: 98.1 F | DIASTOLIC BLOOD PRESSURE: 80 MMHG | HEART RATE: 95 BPM

## 2017-06-27 DIAGNOSIS — K21.00 GASTROESOPHAGEAL REFLUX DISEASE WITH ESOPHAGITIS: ICD-10-CM

## 2017-06-27 DIAGNOSIS — E66.9 OBESITY (BMI 30.0-34.9): ICD-10-CM

## 2017-06-27 DIAGNOSIS — K92.0 HEMATEMESIS WITH NAUSEA: ICD-10-CM

## 2017-06-27 PROCEDURE — 99214 OFFICE O/P EST MOD 30 MIN: CPT | Performed by: FAMILY MEDICINE

## 2017-06-27 PROCEDURE — 83013 H PYLORI (C-13) BREATH: CPT

## 2017-06-27 RX ORDER — OMEPRAZOLE 40 MG/1
40 CAPSULE, DELAYED RELEASE ORAL DAILY
Qty: 90 CAP | Refills: 3 | Status: SHIPPED | OUTPATIENT
Start: 2017-06-27 | End: 2017-10-10

## 2017-06-27 NOTE — PROGRESS NOTES
"Subjective:   Mak Miranda is a 32 y.o. male here today for GERD and hematemesis    GERD (gastroesophageal reflux disease)  Pain is 1-7/10, currently eating causes increased pain and nausea.  Long history of frequent nausea and dry heaving first thing in the morning, which is still occurring. Years ago patient was on Protonix without much improvement. Has medical marijuana which helps a little.  Patient has never had an EGD and has never been tested for H. pylori, to his knowledge.    Hematemesis with nausea  Patient has dry heaving frequently in the morning. A couple days ago he had dry heaving and vomiting of bright red blood. He denies any dark stools.           Current medicines (including changes today)  Current Outpatient Prescriptions   Medication Sig Dispense Refill   • omeprazole (PRILOSEC) 40 MG delayed-release capsule Take 1 Cap by mouth every day. 90 Cap 3   • oxycodone immediate-release (ROXICODONE) 5 MG Tab Take 2 Tabs by mouth every 3 hours as needed for Severe Pain. 60 Tab 0     No current facility-administered medications for this visit.     He  has a past medical history of Anxiety; Hypertension; Pain; Bipolar 1 disorder, mixed (CMS-[facility]) (7/1/2014); Oppositional defiant disorder; and Arthritis.    ROS   No chest pain, no shortness of breath       Objective:     Blood pressure 122/80, pulse 95, temperature 36.7 °C (98.1 °F), resp. rate 16, height 1.854 m (6' 0.99\"), weight 111.585 kg (246 lb), SpO2 95 %. Body mass index is 32.46 kg/(m^2).   Physical Exam:  Constitutional: Alert, no distress.  Skin: Warm, dry, good turgor, no rashes in visible areas.  Eye: Equal, round and reactive, conjunctiva clear, lids normal.  Psych: Alert and oriented x3, normal affect and mood.        Assessment and Plan:   The following treatment plan was discussed    1. Gastroesophageal reflux disease with esophagitis  Start patient omeprazole 40 mg daily. Test for H. pylori with a breath test before starting omeprazole. " If he is unable to have the breath test done, then we will change the order to a stool test.  Referral to gastroenterology for evaluation.  - omeprazole (PRILOSEC) 40 MG delayed-release capsule; Take 1 Cap by mouth every day.  Dispense: 90 Cap; Refill: 3  - H. PYLORI BREATH TEST  - REFERRAL TO GASTROENTEROLOGY    2. Hematemesis with nausea  Possible esophageal tear that has spontaneously resolved. Prescription for omeprazole to see if we can help with nausea and vomiting. Referral to GI.  - REFERRAL TO GASTROENTEROLOGY    3. Obesity (BMI 30.0-34.9)  - Patient identified as having weight management issue.  Appropriate orders and counseling given.      Followup: Return if symptoms worsen or fail to improve.

## 2017-06-27 NOTE — MR AVS SNAPSHOT
"        Mak Miranda   2017 4:20 PM   Office Visit   MRN: [id number]    Department:  [facility]   Dept Phone:  [phone]    Description:  Male : 1985   Provider:  Fito Matthews M.D.           Reason for Visit     Vomiting Blood     Referral Needed GI      Allergies as of 2017     Allergen Noted Reactions    Sulfa Drugs 2014       As infant; doesn't remember rxn      You were diagnosed with     Gastroesophageal reflux disease with esophagitis   [1098707]       Hematemesis with nausea   [0053539]       Obesity (BMI 30.0-34.9)   [228205]         Vital Signs     Blood Pressure Pulse Temperature Respirations Height Weight    122/80 mmHg 95 36.7 °C (98.1 °F) 16 1.854 m (6' 0.99\") 111.585 kg (246 lb)    Body Mass Index Oxygen Saturation Smoking Status             32.46 kg/m2 95% Never Smoker          Basic Information     Date Of Birth Sex Race Ethnicity Preferred Language    1985 Male White Non- English      Problem List              ICD-10-CM Priority Class Noted - Resolved    GERD (gastroesophageal reflux disease) K21.9   2014 - Present    Hyperlipidemia E78.5   2014 - Present    Obesity (BMI 30.0-34.9) E66.9   2014 - Present    Right elbow pain M25.521   10/6/2015 - Present    Epigastric pain R10.13   2016 - Present    Chronic fatigue R53.82   2016 - Present    Generalized anxiety disorder F41.1   2016 - Present    CLARE (generalized anxiety disorder) F41.1   10/4/2016 - Present    Obsessive compulsive disorder F42.9   10/4/2016 - Present    Left knee pain M25.562   2016 - Present    Hematemesis with nausea K92.0, R11.0   2017 - Present      Health Maintenance        Date Due Completion Dates    IMM DTaP/Tdap/Td Vaccine (2 - Td) 2027            Current Immunizations     Influenza Vaccine Quad Inj (Preserved) 2015, 2014    MMR Vaccine 1986    Tdap Vaccine 2017  2:25 AM    Tuberculin Skin Test 2014 " 12:25 PM, 4/14/2014 10:27 AM    Varicella Vaccine Live 5/15/1991      Below and/or attached are the medications your provider expects you to take. Review all of your home medications and newly ordered medications with your provider and/or pharmacist. Follow medication instructions as directed by your provider and/or pharmacist. Please keep your medication list with you and share with your provider. Update the information when medications are discontinued, doses are changed, or new medications (including over-the-counter products) are added; and carry medication information at all times in the event of emergency situations     Allergies:  SULFA DRUGS - (reactions not documented)               Medications  Valid as of: June 27, 2017 -  4:36 PM    Generic Name Brand Name Tablet Size Instructions for use    Omeprazole (CAPSULE DELAYED RELEASE) PRILOSEC 40 MG Take 1 Cap by mouth every day.        OxyCODONE HCl (Tab) ROXICODONE 5 MG Take 2 Tabs by mouth every 3 hours as needed for Severe Pain.        .                 Medicines prescribed today were sent to:     [facility] #[id number] - [place] - [address] [place] [address]    Phone: [phone] Fax: [phone]    Open 24 Hours?: No      Medication refill instructions:       If your prescription bottle indicates you have medication refills left, it is not necessary to call your provider’s office. Please contact your pharmacy and they will refill your medication.    If your prescription bottle indicates you do not have any refills left, you may request refills at any time through one of the following ways: The online [employer] system (except Urgent Care), by calling your provider’s office, or by asking your pharmacy to contact your provider’s office with a refill request. Medication refills are processed only during regular business hours and may not be available until the next business day. Your provider may request additional information or to have a  follow-up visit with you prior to refilling your medication.   *Please Note: Medication refills are assigned a new Rx number when refilled electronically. Your pharmacy may indicate that no refills were authorized even though a new prescription for the same medication is available at the pharmacy. Please request the medicine by name with the pharmacy before contacting your provider for a refill.        Referral     A referral request has been sent to our patient care coordination department. Please allow 3-5 business days for us to process this request and contact you either by phone or mail. If you do not hear from us by the 5th business day, please call us at [phone].           [employer] Access Code: Activation code not generated  Current [employer] Status: Active

## 2017-06-27 NOTE — ASSESSMENT & PLAN NOTE
Patient has dry heaving frequently in the morning. A couple days ago he had dry heaving and vomiting of bright red blood. He denies any dark stools.

## 2017-06-27 NOTE — ASSESSMENT & PLAN NOTE
Pain is 1-7/10, currently eating causes increased pain and nausea.  Long history of frequent nausea and dry heaving first thing in the morning, which is still occurring. Years ago patient was on Protonix without much improvement. Has medical marijuana which helps a little.  Patient has never had an EGD and has never been tested for H. pylori, to his knowledge.

## 2017-06-28 LAB — UREA BREATH TEST QL: NEGATIVE

## 2017-06-29 ENCOUNTER — TELEPHONE (OUTPATIENT)
Dept: MEDICAL GROUP | Facility: MEDICAL CENTER | Age: 32
End: 2017-06-29

## 2017-06-29 NOTE — TELEPHONE ENCOUNTER
----- Message from Fito Matthews M.D. sent at 6/29/2017  7:43 AM PDT -----  Please notify patient that he does not have H. Pylori. Have him continue with the omperazole once daily and schedule to see GI, referral was placed at our appointment.  Fito Matthews M.D.

## 2017-07-03 ENCOUNTER — OFFICE VISIT (OUTPATIENT)
Dept: URGENT CARE | Facility: CLINIC | Age: 32
End: 2017-07-03
Payer: COMMERCIAL

## 2017-07-03 ENCOUNTER — APPOINTMENT (OUTPATIENT)
Dept: RADIOLOGY | Facility: IMAGING CENTER | Age: 32
End: 2017-07-03
Attending: FAMILY MEDICINE
Payer: COMMERCIAL

## 2017-07-03 VITALS
DIASTOLIC BLOOD PRESSURE: 68 MMHG | OXYGEN SATURATION: 97 % | TEMPERATURE: 97.9 F | WEIGHT: 243.4 LBS | HEIGHT: 72 IN | BODY MASS INDEX: 32.97 KG/M2 | HEART RATE: 68 BPM | SYSTOLIC BLOOD PRESSURE: 100 MMHG

## 2017-07-03 DIAGNOSIS — S93.135A SUBLUXATION OF INTERPHALANGEAL JOINT OF LEFT LESSER TOE(S), INITIAL ENCOUNTER: ICD-10-CM

## 2017-07-03 DIAGNOSIS — S99.922A TOE INJURY, LEFT, INITIAL ENCOUNTER: ICD-10-CM

## 2017-07-03 PROCEDURE — L3260 AMBULATORY SURGICAL BOOT EAC: HCPCS | Mod: LT | Performed by: FAMILY MEDICINE

## 2017-07-03 PROCEDURE — 28660 TREAT TOE DISLOCATION: CPT | Mod: T4 | Performed by: FAMILY MEDICINE

## 2017-07-03 PROCEDURE — 73660 X-RAY EXAM OF TOE(S): CPT | Mod: TC,LT | Performed by: FAMILY MEDICINE

## 2017-07-03 PROCEDURE — 99213 OFFICE O/P EST LOW 20 MIN: CPT | Mod: 25 | Performed by: FAMILY MEDICINE

## 2017-07-03 ASSESSMENT — ENCOUNTER SYMPTOMS
NAUSEA: 0
VOMITING: 0
FEVER: 0
DIZZINESS: 0
SHORTNESS OF BREATH: 0

## 2017-07-04 NOTE — PROGRESS NOTES
Subjective:      Mak Miranda is a 32 y.o. male who presents with Toe Injury          Toe Injury  This is a new problem. The current episode started today. The problem occurs constantly. The problem has been unchanged. Associated symptoms include chest pain. Pertinent negatives include no fever, nausea or vomiting. The symptoms are aggravated by walking (movement). He has tried nothing for the symptoms.       Review of Systems   Constitutional: Negative for fever.   Respiratory: Negative for shortness of breath.    Cardiovascular: Positive for chest pain.   Gastrointestinal: Negative for nausea and vomiting.   Neurological: Negative for dizziness.     PMH:  has a past medical history of Anxiety; Hypertension; Pain; Bipolar 1 disorder, mixed (CMS-HCC) (7/1/2014); Oppositional defiant disorder; and Arthritis.  MEDS:   Current outpatient prescriptions:   •  omeprazole (PRILOSEC) 40 MG delayed-release capsule, Take 1 Cap by mouth every day., Disp: 90 Cap, Rfl: 3  •  oxycodone immediate-release (ROXICODONE) 5 MG Tab, Take 2 Tabs by mouth every 3 hours as needed for Severe Pain., Disp: 60 Tab, Rfl: 0  ALLERGIES:   Allergies   Allergen Reactions   • Sulfa Drugs      As infant; doesn't remember rxn     SURGHX:   Past Surgical History   Procedure Laterality Date   • Arthroscopy, knee  1992     Left, Osteocondritis dissicans   • Other orthopedic surgery  2005     right elbow   • Knee arthroscopy Left 11/23/2016     Procedure: KNEE ARTHROSCOPY;  Surgeon: Marc Fry M.D.;  Location: SURGERY [facility];  Service:      SOCHX:  reports that he has never smoked. He has never used smokeless tobacco. He reports that he drinks about 0.6 oz of alcohol per week. He reports that he uses illicit drugs (Marijuana).  FH: Family history was reviewed, no pertinent findings to report      Objective:     /68 mmHg  Pulse 68  Temp(Src) 36.6 °C (97.9 °F)  Ht 1.829 m (6')  Wt 110.406 kg (243 lb 6.4 oz)  BMI 33.00 kg/m2   SpO2 97%     Physical Exam   Constitutional: He appears well-developed.   Pulmonary/Chest: Effort normal.   Musculoskeletal:        Right foot: There is normal range of motion, no tenderness, no bony tenderness, no swelling and no deformity.        Left foot: There is decreased range of motion, tenderness, bony tenderness, swelling and deformity. There is normal capillary refill, no crepitus and no laceration.        Feet:    Neurological: He is alert.   Skin: Skin is warm and dry.   Psychiatric: He has a normal mood and affect.                Assessment/Plan:     1. Toe injury, left, initial encounter  DX-TOE(S) 2+ LEFT   2. Subluxation of interphalangeal joint of left lesser toe(s), initial encounter  REFERRAL TO SPORTS MEDICINE     Lidocaine  Reduced toe  Stabilized with Ar tape and post op shoe    Declined pain medication  Has used ibuprofen for fractures in the past    Return in about 7 days (around 7/10/2017).      RADIOLOGIC STUDY:  Left toe series  Ordered and interpreted in the office today  Subluxation of the distal interphalangeal joint of the left fifth toe      PROCEDURE NOTE:  Left 5th  toe block at the DIP joint  Pencil used as fulcrum and toe was realigned with very good cosmetic result  Patient tolerated the procedure well  Postprocedure care reflect discussed  Ar taped  Postop shoe provided    Follow-up in one week and sports medicine clinic or sooner if he is having issues

## 2017-10-10 ENCOUNTER — OFFICE VISIT (OUTPATIENT)
Dept: MEDICAL GROUP | Facility: PHYSICIAN GROUP | Age: 32
End: 2017-10-10
Payer: COMMERCIAL

## 2017-10-10 VITALS
HEIGHT: 74 IN | WEIGHT: 243 LBS | RESPIRATION RATE: 14 BRPM | SYSTOLIC BLOOD PRESSURE: 122 MMHG | DIASTOLIC BLOOD PRESSURE: 82 MMHG | OXYGEN SATURATION: 96 % | BODY MASS INDEX: 31.18 KG/M2 | HEART RATE: 74 BPM | TEMPERATURE: 98.1 F

## 2017-10-10 DIAGNOSIS — H65.113 ACUTE MUCOID OTITIS MEDIA OF BOTH EARS: ICD-10-CM

## 2017-10-10 DIAGNOSIS — H93.8X3 EAR PRESSURE, BILATERAL: ICD-10-CM

## 2017-10-10 DIAGNOSIS — H60.392 OTHER INFECTIVE ACUTE OTITIS EXTERNA OF LEFT EAR: ICD-10-CM

## 2017-10-10 PROCEDURE — 99214 OFFICE O/P EST MOD 30 MIN: CPT | Performed by: FAMILY MEDICINE

## 2017-10-10 RX ORDER — OFLOXACIN 3 MG/ML
5 SOLUTION AURICULAR (OTIC) DAILY
Qty: 10 ML | Refills: 0 | Status: SHIPPED | OUTPATIENT
Start: 2017-10-10 | End: 2017-10-20

## 2017-10-10 RX ORDER — AMOXICILLIN 875 MG/1
875 TABLET, COATED ORAL 2 TIMES DAILY
Qty: 14 TAB | Refills: 0 | Status: SHIPPED | OUTPATIENT
Start: 2017-10-10 | End: 2017-10-17

## 2017-10-10 ASSESSMENT — PATIENT HEALTH QUESTIONNAIRE - PHQ9: CLINICAL INTERPRETATION OF PHQ2 SCORE: 0

## 2017-10-10 NOTE — PROGRESS NOTES
"Subjective:   Mak Miranda is a 32 y.o. male here today for Ear pressure    Ear pressure, bilateral  New problem. Patient has been having a pressure/fullness sensation in both the ears. Left ear started approximately 3 weeks ago with the right ear starting approximately one week ago. Patient states that out of the left ear he did have some purulent drainage on one occasion. He denies any pain in either ear. He denies any fevers or chills; he denies any other associated symptoms.    Patient denies that he has been in a pool, hot, sauna in the last 2 months.     This is a patient of Dr. Matthews    Current medicines (including changes today)  Current Outpatient Prescriptions   Medication Sig Dispense Refill   • amoxicillin (AMOXIL) 875 MG tablet Take 1 Tab by mouth 2 times a day for 7 days. 14 Tab 0   • ofloxacin otic sol (FLOXIN OTIC) 0.3 % Solution Place 5 Drops in left ear every day for 10 days. Administer drops to both ears. 10 mL 0     No current facility-administered medications for this visit.      He  has a past medical history of Anxiety; Arthritis; Bipolar 1 disorder, mixed (CMS-[facility]) (7/1/2014); Hypertension; Oppositional defiant disorder; and Pain.    ROS   No chest pain, no shortness of breath, no abdominal pain  +Bilateral ear pressure     Objective:     Blood pressure 122/82, pulse 74, temperature 36.7 °C (98.1 °F), resp. rate 14, height 1.892 m (6' 2.49\"), weight 110.2 kg (243 lb), SpO2 96 %. Body mass index is 30.79 kg/m².   Physical Exam:  Alert, oriented in no acute distress.  Eye contact is good, speech goal directed, affect calm  HEENT: conjunctiva non-injected, sclera non-icteric.  Pinna normal. TM Right-erythema appreciated at the 11:00 position with serous drainage noted; TM left-mild erythema with serous drainage noted  Left ear canal-significant erythema along with purulent drainage  Oral mucous membranes pink and moist with no lesions.  Neck No adenopathy or masses in the neck or " supraclavicular regions.  Lungs: clear to auscultation bilaterally with good excursion.  CV: regular rate and rhythm.  Abdomen: soft, nontender, No CVAT  Ext: no edema, color normal, vascularity normal, temperature normal        Assessment and Plan:   The following treatment plan was discussed     1. Acute mucoid otitis media of both ears      Uncontrolled; prescription for amoxicillin 875 mg one tab by mouth twice a day ×7 days.   2. Other infective acute otitis externa of left ear      Uncontrolled; prescription for ofloxacin drops to be used for 10 days in the left ear only.   3. Ear pressure, bilateral      Uncontrolled; symptoms secondary to problems #1 and 2       Followup: Return if symptoms worsen or fail to improve.

## 2017-10-10 NOTE — ASSESSMENT & PLAN NOTE
New problem. Patient has been having a pressure/fullness sensation in both the ears. Left ear started approximately 3 weeks ago with the right ear starting approximately one week ago. Patient states that out of the left ear he did have some purulent drainage on one occasion. He denies any pain in either ear. He denies any fevers or chills; he denies any other associated symptoms.    Patient denies that he has been in a pool, hot, sauna in the last 2 months.

## 2018-01-12 ENCOUNTER — IMMUNIZATION (OUTPATIENT)
Dept: OCCUPATIONAL MEDICINE | Facility: CLINIC | Age: 33
End: 2018-01-12

## 2018-01-12 DIAGNOSIS — Z23 NEED FOR VACCINATION: ICD-10-CM

## 2018-01-12 PROCEDURE — 90686 IIV4 VACC NO PRSV 0.5 ML IM: CPT | Performed by: PREVENTIVE MEDICINE

## 2018-06-05 ENCOUNTER — PATIENT MESSAGE (OUTPATIENT)
Dept: MEDICAL GROUP | Facility: MEDICAL CENTER | Age: 33
End: 2018-06-05

## 2018-06-05 DIAGNOSIS — M25.521 RIGHT ELBOW PAIN: ICD-10-CM

## 2018-06-06 NOTE — TELEPHONE ENCOUNTER
From: Mak Miranda  To: Fito Matthews M.D.  Sent: 6/5/2018 3:00 PM PDT  Subject: Procedure Question    I need to get my elbow re-cortizone ejected. It's starting to really hurt and grind. Dr Gutierrez said I needed a new refferal to get it. Can you put one in? Thank you!

## 2018-07-02 ENCOUNTER — HOSPITAL ENCOUNTER (OUTPATIENT)
Dept: LAB | Facility: MEDICAL CENTER | Age: 33
End: 2018-07-02
Payer: COMMERCIAL

## 2018-07-02 LAB
BDY FAT % MEASURED: 21.7 %
BP DIAS: 89 MMHG
BP SYS: 136 MMHG
CHOLEST SERPL-MCNC: 241 MG/DL (ref 100–199)
DIABETES HTDIA: NO
EVENT NAME HTEVT: NORMAL
FASTING HTFAS: YES
GLUCOSE SERPL-MCNC: 82 MG/DL (ref 65–99)
HDLC SERPL-MCNC: 41 MG/DL
HYPERTENSION HTHYP: YES
LDLC SERPL CALC-MCNC: 159 MG/DL
SCREENING LOC CITY HTCIT: NORMAL
SCREENING LOC STATE HTSTA: NORMAL
SCREENING LOCATION HTLOC: NORMAL
SMOKING HTSMO: NO
SUBSCRIBER ID HTSID: NORMAL
TRIGL SERPL-MCNC: 203 MG/DL (ref 0–149)

## 2018-07-02 PROCEDURE — 80061 LIPID PANEL: CPT

## 2018-07-02 PROCEDURE — 82947 ASSAY GLUCOSE BLOOD QUANT: CPT

## 2018-07-02 PROCEDURE — S5190 WELLNESS ASSESSMENT BY NONPH: HCPCS

## 2018-07-02 PROCEDURE — 36415 COLL VENOUS BLD VENIPUNCTURE: CPT

## 2018-07-27 ENCOUNTER — HOSPITAL ENCOUNTER (EMERGENCY)
Facility: MEDICAL CENTER | Age: 33
End: 2018-07-28
Attending: EMERGENCY MEDICINE
Payer: COMMERCIAL

## 2018-07-27 ENCOUNTER — NON-PROVIDER VISIT (OUTPATIENT)
Dept: OCCUPATIONAL MEDICINE | Facility: CLINIC | Age: 33
End: 2018-07-27
Payer: COMMERCIAL

## 2018-07-27 DIAGNOSIS — Z02.1 PRE-EMPLOYMENT DRUG SCREENING: ICD-10-CM

## 2018-07-27 PROCEDURE — 86706 HEP B SURFACE ANTIBODY: CPT

## 2018-07-27 PROCEDURE — 8899 PR URINE 11 PANEL - AFTER HOURS: Performed by: PREVENTIVE MEDICINE

## 2018-07-27 PROCEDURE — 80053 COMPREHEN METABOLIC PANEL: CPT

## 2018-07-27 PROCEDURE — 99283 EMERGENCY DEPT VISIT LOW MDM: CPT

## 2018-07-27 PROCEDURE — 82075 ASSAY OF BREATH ETHANOL: CPT | Performed by: PREVENTIVE MEDICINE

## 2018-07-27 PROCEDURE — 87389 HIV-1 AG W/HIV-1&-2 AB AG IA: CPT

## 2018-07-27 PROCEDURE — 87340 HEPATITIS B SURFACE AG IA: CPT

## 2018-07-27 PROCEDURE — 85027 COMPLETE CBC AUTOMATED: CPT

## 2018-07-27 PROCEDURE — 86803 HEPATITIS C AB TEST: CPT

## 2018-07-27 PROCEDURE — 80305 DRUG TEST PRSMV DIR OPT OBS: CPT | Performed by: PREVENTIVE MEDICINE

## 2018-07-27 PROCEDURE — 86704 HEP B CORE ANTIBODY TOTAL: CPT

## 2018-07-27 RX ORDER — EMTRICITABINE AND TENOFOVIR DISOPROXIL FUMARATE 200; 300 MG/1; MG/1
1 TABLET, FILM COATED ORAL ONCE
Status: DISCONTINUED | OUTPATIENT
Start: 2018-07-28 | End: 2018-07-28 | Stop reason: HOSPADM

## 2018-07-27 ASSESSMENT — LIFESTYLE VARIABLES
DO YOU DRINK ALCOHOL: YES
TOTAL SCORE: 0
CONSUMPTION TOTAL: INCOMPLETE
EVER FELT BAD OR GUILTY ABOUT YOUR DRINKING: NO
HAVE YOU EVER FELT YOU SHOULD CUT DOWN ON YOUR DRINKING: NO
TOTAL SCORE: 0
EVER HAD A DRINK FIRST THING IN THE MORNING TO STEADY YOUR NERVES TO GET RID OF A HANGOVER: NO
TOTAL SCORE: 0
HAVE PEOPLE ANNOYED YOU BY CRITICIZING YOUR DRINKING: NO

## 2018-07-27 ASSESSMENT — PAIN SCALES - GENERAL: PAINLEVEL_OUTOF10: 0

## 2018-07-27 NOTE — LETTER
"  FORM C-4:  EMPLOYEE’S CLAIM FOR COMPENSATION/ REPORT OF INITIAL TREATMENT  EMPLOYEE’S CLAIM - PROVIDE ALL INFORMATION REQUESTED   First Name  Mak Last Name  Ruth Birthdate             Age  1985 33 y.o. 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 (if applicable)  draining a bless    How did this injury or occupational disease occur? Be specific and answer in detail. Use additional sheet if necessary)  stick with scalpel to remove protectrive dressing and cut tubing for drain   If you believe that you have an occupational disease, when did you first have knowledge of the disability and it relationship to your employment?  n/a Witnesses to the Accident  Vania Borges     Nature of Injury or Occupational Disease  Workers' Compensation  Part(s) of Body Injured or Affected  Finger (L), N/A, N/A    I certify that the above is true and correct to the best of my knowledge and that I have provided this information in order to obtain the benefits of [place]  I hereby authorize any physician, chiropractor, surgeon, practitioner, or other person, any hospital, including [facility] or [facility] hospital, any medical service organization, any insurance company, or other institution or organization to release to each other, any medical or other information, including benefits paid or payable, pertinent to this injury or disease, except information relative to diagnosis, treatment and/or counseling for AIDS, psychological conditions, alcohol or controlled substances, for which I must give specific authorization.  A Photostat of this authorization shall be as valid as the original.   Date Place   Employee’s Signature   THIS REPORT MUST BE COMPLETED AND MAILED WITHIN 3 WORKING DAYS OF TREATMENT   Place  [facility], EMERGENCY DEPT  Name of Facility   [facility]   Date  7/27/2018 Diagnosis  No diagnosis found. Is there evidence the injured employee was under the influence of alcohol and/or another controlled substance at the time of accident?   Hour  12:42 AM Description of Injury or Disease       Treatment     Have you advised the patient to remain off work five days or more?             X-Ray Findings      If Yes   From Date    To Date      From information given by the employee, together with medical evidence, can you directly connect this injury or occupational disease as job incurred?    If No, is the employee capable of: Full Duty    Modified Duty      Is additional medical care by a physician indicated?    If Modified Duty, Specify any Limitations / Restrictions        Do you know of any previous injury or disease contributing to this condition or occupational disease?      Date  7/28/2018 Print Doctor’s Name  Etta Ivey I certify the employer’s copy of this form was mailed on:   Address  [address] [place] [id number] [phone] Insurer’s  "Use Only   [facility] Zip  [address]    Provider’s Tax ID Number    Telephone  Dept: [phone]    Doctor’s Signature    Degree       Original - TREATING PHYSICIAN OR CHIROPRACTOR   Pg 2-Insurer/TPA   Pg 3-Employer   Pg 4-Employee                                                                                                  Form C-4 (rev01/03)     BRIEF DESCRIPTION OF RIGHTS AND BENEFITS  (Pursuant to [place] 616[place])    Notice of Injury or Occupational Disease (Incident Report Form C-1): If an injury or occupational disease (OD) arises out of and in the course of employment, you must provide written notice to your employer as soon as practicable, but no later than 7 days after the accident or OD. Your employer shall maintain a sufficient supply of the required forms.    Claim for Compensation (Form C-4): If medical treatment is sought, the form C-4 is available at the place of initial treatment. A completed \"Claim for Compensation\" (Form C-4) must be filed within 90 days after an accident or OD. The treating physician or chiropractor must, within 3 working days after treatment, complete and mail to the employer, the employer's insurer and third-party , the Claim for Compensation.    Medical Treatment: If you require medical treatment for your on-the-job injury or OD, you may be required to select a physician or chiropractor from a list provided by your workers’ compensation insurer, if it has contracted with an Organization for Managed Care (MCO) or Preferred Provider Organization (PPO) or providers of health care. If your employer has not entered into a contract with an MCO or PPO, you may select a physician or chiropractor from the Panel of Physicians and Chiropractors. Any medical costs related to your industrial injury or OD will be paid by your insurer.    Temporary Total Disability (TTD): If your doctor has certified that you are unable to work for a period of at least 5 " consecutive days, or 5 cumulative days in a 20-day period, or places restrictions on you that your employer does not accommodate, you may be entitled to TTD compensation.    Temporary Partial Disability (TPD): If the wage you receive upon reemployment is less than the compensation for TTD to which you are entitled, the insurer may be required to pay you TPD compensation to make up the difference. TPD can only be paid for a maximum of 24 months.    Permanent Partial Disability (PPD): When your medical condition is stable and there is an indication of a PPD as a result of your injury or OD, within 30 days, your insurer must arrange for an evaluation by a rating physician or chiropractor to determine the degree of your PPD. The amount of your PPD award depends on the date of injury, the results of the PPD evaluation and your age and wage.    Permanent Total Disability (PTD): If you are medically certified by a treating physician or chiropractor as permanently and totally disabled and have been granted a PTD status by your insurer, you are entitled to receive monthly benefits not to exceed 66 2/3% of your average monthly wage. The amount of your PTD payments is subject to reduction if you previously received a PPD award.    Vocational Rehabilitation Services: You may be eligible for vocational rehabilitation services if you are unable to return to the job due to a permanent physical impairment or permanent restrictions as a result of your injury or occupational disease.    Transportation and Per Ludwig Reimbursement: You may be eligible for travel expenses and per ludwig associated with medical treatment.  Reopening: You may be able to reopen your claim if your condition worsens after claim closure.    Appeal Process: If you disagree with a written determination issued by the insurer or the insurer does not respond to your request, you may appeal to the Department of Administration, , by following the  instructions contained in your determination letter. You must appeal the determination within 70 days from the date of the determination letter at [address], [address], [place], [place] [address], or [address], [address], [place], [place] [address]. If you disagree with the  decision, you may appeal to the Department of Administration, . You must file your appeal within 30 days from the date of the  decision letter at [address], [address], [place], [place] [address], or [address], [address], [place], [place] [address]. If you disagree with a decision of an , you may file a petition for judicial review with the District Court. You must do so within 30 days of the Appeal Officer’s decision. You may be represented by an  at your own expense or you may contact the [facility] for possible representation.    [place]  for Injured Workers (NAIW): If you disagree with a  decision, you may request that NAIW represent you without charge at an  Hearing. For information regarding denial of benefits, you may contact the [facility] at: [address], [address], [place] [address], [phone], or [address], [place] [address], [phone]    To File a Complaint with the Division: If you wish to file a complaint with the  of the Division of Industrial Relations (DIR), please contact the Workers’ Compensation Section, [address], [address], [place], [place] [address], telephone [phone]) [phone], or [address], [facility] [address], [place] [address], telephone [phone].    For assistance with Workers’ Compensation Issues: you may contact the Office of the Governor Consumer Health Assistance, [address] [place] [address], [address], [place], [place] [address], Toll Free 1-318.863.4589, Web site: http://govcha.[facility].[place].,  E-mail [email or web address].state.[email or web address]                                                                                                                                                                               __________________________________________________________________                                    _________________            Employee Name / Signature                                                                                                                            Date                                       D-2 (rev. 10/07)

## 2018-07-27 NOTE — LETTER
"  FORM C-4:  EMPLOYEE’S CLAIM FOR COMPENSATION/ REPORT OF INITIAL TREATMENT  EMPLOYEE’S CLAIM - PROVIDE ALL INFORMATION REQUESTED   First Name  Mak Last Name  Ruth Birthdate             Age  1985 33 y.o. 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 (if applicable)  draining a bless    How did this injury or occupational disease occur? Be specific and answer in detail. Use additional sheet if necessary)  stick with scalpel to remove protectrive dressing and cut tubing for drain   If you believe that you have an occupational disease, when did you first have knowledge of the disability and it relationship to your employment?  n/a Witnesses to the Accident  Vania Borges     Nature of Injury or Occupational Disease  Workers' Compensation  Part(s) of Body Injured or Affected  Finger (L), N/A, N/A    I certify that the above is true and correct to the best of my knowledge and that I have provided this information in order to obtain the benefits of [place] and " [place]).  I hereby authorize any physician, chiropractor, surgeon, practitioner, or other person, any hospital, including [facility] or [facility], any medical service organization, any insurance company, or other institution or organization to release to each other, any medical or other information, including benefits paid or payable, pertinent to this injury or disease, except information relative to diagnosis, treatment and/or counseling for AIDS, psychological conditions, alcohol or controlled substances, for which I must give specific authorization.  A Photostat of this authorization shall be as valid as the original.   Date  07/28/18 Place  [facility]  Employee’s Signature   THIS REPORT MUST BE COMPLETED AND MAILED WITHIN 3 WORKING DAYS OF TREATMENT   Place  [facility], EMERGENCY DEPT  Name of Facility   [facility]   Date  7/27/2018 Diagnosis  (S61.239A,  W27.3XXA) Needlestick injury of finger, initial encounter Is there evidence the injured employee was under the influence of alcohol and/or another controlled substance at the time of accident?   Hour  3:40 AM Description of Injury or Disease  Needlestick injury of finger, initial encounter No   Treatment  Occupational health follow up  Have you advised the patient to remain off work five days or more?         No   X-Ray Findings    Comments:na   If Yes   From Date    To Date      From information given by the employee, together with medical evidence, can you directly connect this injury or occupational disease as job incurred?  Yes If No, is the employee capable of: Full Duty  Yes Modified Duty      Is additional medical care by a physician indicated?  Yes If Modified Duty, Specify any Limitations / Restrictions        Do you know of any previous injury or disease contributing to this condition or occupational disease?  No   "  Date  7/28/2018 Print Doctor’s Name  UcheEtta I certify the employer’s copy of this form was mailed on:   Address  [address] [address] [phone] Insurer’s Use Only   [facility]  [address]    Provider’s Tax ID Number    Telephone  Dept: [phone]    Doctor’s Signature  e-ETTA John M.D. Degree   MD    Original - TREATING PHYSICIAN OR CHIROPRACTOR   Pg 2-Insurer/TPA   Pg 3-Employer   Pg 4-Employee                                                                                                  Form C-4 (rev01/03)     BRIEF DESCRIPTION OF RIGHTS AND BENEFITS  (Pursuant to [place] 616C.050)    Notice of Injury or Occupational Disease (Incident Report Form C-1): If an injury or occupational disease (OD) arises out of and in the course of employment, you must provide written notice to your employer as soon as practicable, but no later than 7 days after the accident or OD. Your employer shall maintain a sufficient supply of the required forms.  Claim for Compensation (Form C-4): If medical treatment is sought, the form C-4 is available at the place of initial treatment. A completed \"Claim for Compensation\" (Form C-4) must be filed within 90 days after an accident or OD. The treating physician or chiropractor must, within 3 working days after treatment, complete and mail to the employer, the employer's insurer and third-party , the Claim for Compensation.  Medical Treatment: If you require medical treatment for your on-the-job injury or OD, you may be required to select a physician or chiropractor from a list provided by your workers’ compensation insurer, if it has contracted with an Organization for Managed Care (MCO) or Preferred Provider Organization (PPO) or providers of health care. If your employer has not entered into a contract with an MCO or PPO, you may select a physician or chiropractor from the Panel of Physicians and Chiropractors. Any medical " costs related to your industrial injury or OD will be paid by your insurer.  Temporary Total Disability (TTD): If your doctor has certified that you are unable to work for a period of at least 5 consecutive days, or 5 cumulative days in a 20-day period, or places restrictions on you that your employer does not accommodate, you may be entitled to TTD compensation.  Temporary Partial Disability (TPD): If the wage you receive upon reemployment is less than the compensation for TTD to which you are entitled, the insurer may be required to pay you TPD compensation to make up the difference. TPD can only be paid for a maximum of 24 months.  Permanent Partial Disability (PPD): When your medical condition is stable and there is an indication of a PPD as a result of your injury or OD, within 30 days, your insurer must arrange for an evaluation by a rating physician or chiropractor to determine the degree of your PPD. The amount of your PPD award depends on the date of injury, the results of the PPD evaluation and your age and wage.  Permanent Total Disability (PTD): If you are medically certified by a treating physician or chiropractor as permanently and totally disabled and have been granted a PTD status by your insurer, you are entitled to receive monthly benefits not to exceed 66 2/3% of your average monthly wage. The amount of your PTD payments is subject to reduction if you previously received a PPD award.  Vocational Rehabilitation Services: You may be eligible for vocational rehabilitation services if you are unable to return to the job due to a permanent physical impairment or permanent restrictions as a result of your injury or occupational disease.  Transportation and Per Ludwig Reimbursement: You may be eligible for travel expenses and per ludwig associated with medical treatment.  Reopening: You may be able to reopen your claim if your condition worsens after claim closure.  Appeal Process: If you disagree with a  written determination issued by the insurer or the insurer does not respond to your request, you may appeal to the Department of Administration, , by following the instructions contained in your determination letter. You must appeal the determination within 70 days from the date of the determination letter at [address], [address], [place], [place] [address], or [address], [address], [place], [place] [address]. If you disagree with the  decision, you may appeal to the Department of Administration, . You must file your appeal within 30 days from the date of the  decision letter at [address], [address], [place], [place] [address], or [address], [address], [place], [place] [address]. If you disagree with a decision of an , you may file a petition for judicial review with the District Court. You must do so within 30 days of the Appeal Officer’s decision. You may be represented by an  at your own expense or you may contact the [facility] for possible representation.  [place]  for Injured Workers (NAIW): If you disagree with a  decision, you may request that NAIW represent you without charge at an  Hearing. For information regarding denial of benefits, you may contact the [facility] at: [address], [address], [place] [address], [phone], or [address], [address], [place] [address], [phone]  To File a Complaint with the Division: If you wish to file a complaint with the  of the Division of Industrial Relations (DIR), please contact the Workers’ Compensation Section, [address], [address], [place], [place] [address], telephone [phone], or [address], [address], [place], [place] [address], telephone [phone].  For assistance with Workers’ Compensation Issues: you may contact the Office of  the [facility] Consumer Health Assistance, [address], [address], [place] [address], Toll Free 1-260.952.6148, Web site: http://govcha.[facility].nv., E-mail alesia@[email or web address].[email or web address].                                                                                                                                                                               __________________________________________________________________                                    _________________            Employee Name / Signature                                                                                                                            Date                                       D-2 (rev. 10/07)

## 2018-07-28 VITALS
BODY MASS INDEX: 31.14 KG/M2 | RESPIRATION RATE: 15 BRPM | SYSTOLIC BLOOD PRESSURE: 126 MMHG | HEART RATE: 79 BPM | OXYGEN SATURATION: 97 % | WEIGHT: 235 LBS | TEMPERATURE: 97.6 F | DIASTOLIC BLOOD PRESSURE: 78 MMHG | HEIGHT: 73 IN

## 2018-07-28 LAB
ALBUMIN SERPL BCP-MCNC: 5.1 G/DL (ref 3.2–4.9)
ALBUMIN/GLOB SERPL: 2.1 G/DL
ALP SERPL-CCNC: 88 U/L (ref 30–99)
ALT SERPL-CCNC: 26 U/L (ref 2–50)
ANION GAP SERPL CALC-SCNC: 10 MMOL/L (ref 0–11.9)
AST SERPL-CCNC: 16 U/L (ref 12–45)
BILIRUB SERPL-MCNC: 1.2 MG/DL (ref 0.1–1.5)
BUN SERPL-MCNC: 19 MG/DL (ref 8–22)
CALCIUM SERPL-MCNC: 9.8 MG/DL (ref 8.5–10.5)
CHLORIDE SERPL-SCNC: 105 MMOL/L (ref 96–112)
CO2 SERPL-SCNC: 23 MMOL/L (ref 20–33)
CREAT SERPL-MCNC: 0.84 MG/DL (ref 0.5–1.4)
ERYTHROCYTE [DISTWIDTH] IN BLOOD BY AUTOMATED COUNT: 35.4 FL (ref 35.9–50)
GLOBULIN SER CALC-MCNC: 2.4 G/DL (ref 1.9–3.5)
GLUCOSE SERPL-MCNC: 103 MG/DL (ref 65–99)
HBV CORE AB SERPL QL IA: NEGATIVE
HBV SURFACE AB SERPL IA-ACNC: 24.6 MIU/ML (ref 0–10)
HBV SURFACE AG SER QL: NEGATIVE
HCT VFR BLD AUTO: 47.7 % (ref 42–52)
HCV AB SER QL: NEGATIVE
HGB BLD-MCNC: 17.1 G/DL (ref 14–18)
HIV 1+2 AB+HIV1 P24 AG SERPL QL IA: NON REACTIVE
MCH RBC QN AUTO: 31.8 PG (ref 27–33)
MCHC RBC AUTO-ENTMCNC: 35.8 G/DL (ref 33.7–35.3)
MCV RBC AUTO: 88.8 FL (ref 81.4–97.8)
PLATELET # BLD AUTO: 238 K/UL (ref 164–446)
PMV BLD AUTO: 10.5 FL (ref 9–12.9)
POTASSIUM SERPL-SCNC: 3.7 MMOL/L (ref 3.6–5.5)
PROT SERPL-MCNC: 7.5 G/DL (ref 6–8.2)
RBC # BLD AUTO: 5.37 M/UL (ref 4.7–6.1)
SODIUM SERPL-SCNC: 138 MMOL/L (ref 135–145)
WBC # BLD AUTO: 9.1 K/UL (ref 4.8–10.8)

## 2018-07-28 NOTE — ED NOTES
Break Rn: Discharge instructions given to patient, a verbal understanding of all instructions was stated. Pt preferred to walk out. VSS, all belongings accounted for.

## 2018-07-28 NOTE — ED TRIAGE NOTES
"Mak Miranda  33 y.o. male  Chief Complaint   Patient presents with   • Body Fluid Exposure     Pt. states he got stuck with a dirty scalple on his 2nd left digit.     /97   Pulse 80   Temp 36.4 °C (97.6 °F)   Resp 18   Ht 1.854 m (6' 1\")   Wt 106.6 kg (235 lb)   SpO2 94%   BMI 31.00 kg/m²     Pt amb to triage with steady gait for above complaint.   Pt is alert and oriented, speaking in full sentences, follows commands and responds appropriately to questions. NAD. Resp are even and unlabored.  Pt placed in lobby. Pt educated on triage process. Pt encouraged to alert staff for any changes.  "

## 2018-07-28 NOTE — ED PROVIDER NOTES
ED Provider Note    Scribed for Dr. Etta Ivey M.D. by Lynn Vargas. 7/28/2018, 12:22 AM.    CHIEF COMPLAINT  Chief Complaint   Patient presents with   • Body Fluid Exposure     Pt. states he got stuck with a dirty scalple on his 2nd left digit.       HPI  Mak Miranda is a 33 y.o. 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 He sustained a small cut with no active bleeding to his left index finger with a used needlestick. It is currently unknown if patient is positive for HIV.     REVIEW OF SYSTEMS  Pertinent positives include left index finger laceration.   Pertinent negatives include no active bleeding.  E.     PAST MEDICAL HISTORY   has a past medical history of Anxiety; Arthritis; Bipolar 1 disorder, mixed (HCC) (7/1/2014); Hypertension; Oppositional defiant disorder; and Pain.    SOCIAL HISTORY  Social History   Substance Use Topics   • Smoking status: Never Smoker   • Smokeless tobacco: Never Used      Comment: Pipe once a month   • Alcohol use 0.6 oz/week     1 Cans of beer per week      Comment: 1 drink per week      History   Drug Use   • Types: Marijuana     Comment: tablets, medical THC       SURGICAL HISTORY   has a past surgical history that includes arthroscopy, knee (1992); other orthopedic surgery (2005); and knee arthroscopy (Left, 11/23/2016).    FAMILY HISTORY  Family History   Problem Relation Age of Onset   • Schizophrenia Mother    • Bipolar disorder Mother        CURRENT MEDICATIONS  Home Medications     Reviewed by Vero Driscoll R.N. (Registered Nurse) on 07/27/18 at 2324  Med List Status: Complete   Medication Last Dose Status        Patient Gerson Taking any Medications                       ALLERGIES  Allergies   Allergen Reactions   • Sulfa Drugs      As infant; doesn't remember rxn       PHYSICAL EXAM  VITAL SIGNS: /97   Pulse 80   Temp 36.4 °C (97.6 °F)   Resp 18    "Ht 1.854 m (6' 1\")   Wt 106.6 kg (235 lb)   SpO2 94%   BMI 31.00 kg/m²     Constitutional: Alert in no apparent distress. Well apearing  HENT: Normocephalic, Atraumatic, Bilateral external ears normal. Nose normal.   Eyes:  Conjunctiva normal, non-icteric.   Lungs: Non-labored respirations  Skin: Small laceration to left index finger with no active bleeding, Warm, Dry, No erythema, No rash.   Neurologic: Alert, Grossly non-focal.   Psychiatric: Affect normal, Judgment normal, Mood normal, Appears appropriate and not intoxicated.     COURSE & MEDICAL DECISION MAKING  Pertinent Labs & Imaging studies reviewed. (See chart for details)    12:22 AM Patient seen and examined at triage.  Baseline labs were obtained and the source patient's labs were sent for HIV testing    2:50 AM Patient reevaluated. Update him that the lab machine is down for the night per . He is comfortable with receiving results tomorrow and agrees to discharge home. Patient was advised to follow up with Occupational Health. His vitals prior to discharge are: /97   Pulse 80   Temp 36.4 °C (97.6 °F)   Resp 18   Ht 1.854 m (6' 1\")   Wt 106.6 kg (235 lb)   SpO2 94%   BMI 31.00 kg/m²      The patient will return for new or worsening symptoms and is stable at the time of discharge. Patient was given return precautions. Patient and/or family member verbalizes understanding and will comply.    DISPOSITION:  Patient will be discharged home in stable condition.    FOLLOW UP:  [address]  [facility] [address] [phone]  Schedule an appointment as soon as possible for a visit  tomorrow    [facility], Emergency Dept  [address] [address] [phone]    If symptoms worsen        OUTPATIENT MEDICATIONS:  There are no discharge medications for this patient.    FINAL IMPRESSION  1. Needlestick injury of finger, initial encounter       This " dictation has been created using voice recognition software and/or scribes. The accuracy of the dictation is limited by the abilities of the software and the expertise of the scribes. I expect there may be some errors of grammar and possibly content. I made every attempt to manually correct the errors within my dictation. However, errors related to voice recognition software and/or scribes may still exist and should be interpreted within the appropriate context.    The note accurately reflects work and decisions made by me.  Etta Ivey  7/28/2018  5:04 AM

## 2018-07-28 NOTE — DISCHARGE INSTRUCTIONS
Needlestick Injury  A needlestick injury happens when a person is stuck with a needle that may have someone else's blood on it. A needlestick injury may expose you to blood that carries infections.  What are the causes?  This injury is caused by mishandling a needle. It can happen when you are:  · Giving an injection.  · Drawing blood.  · Performing medical procedures with a needle or scalpel.  · Handling or throwing away used needles (sharps).  What increases the risk?  This injury is most likely to happen to health care providers who give medicine by injection, draw blood, give stitches (sutures), or handle used needles. It is more likely to happen to health care providers who:  · Work shifts that are longer than 8 hours.  · Do not have experience or proper training in handling needles.  · Feel pressure or a sense of urgency.  · Are very tired.  What are the signs or symptoms?  Symptoms of this injury include:  · Pain or irritation at the injury site.  · Bleeding at the injury site.  How is this diagnosed?  This injury is diagnosed based on how the injury happened and a physical exam. Your health care provider may check the medical history of the person whose blood your were exposed to and test that person's blood.  How is this treated?  This injury is treated by cleaning the injured area right away with soap and water or an alcohol-based solution. Treatment may also involve:  · Getting a tetanus booster shot. This shot may be given if you have not had a tetanus booster shot within the past 10 years.  · Getting a hepatitis B vaccination.  · Getting blood tests. These may be recommended for up to 6 months after the injury to rule out infection.  · Taking medicines to prevent possible infection.  Follow these instructions at home:  · Take over-the-counter and prescription medicines only as told by your health care provider.  · Keep all follow-up visits as told by your health care provider. This is important.  · Do  not share personal hygiene items, such as razors and toothbrushes.  Contact a health care provider if:  · You feel anxious, angry, or depressed.  · You have difficulty sleeping.  · Your skin or the whites of your eyes look yellow (jaundice).  · You have belly pain or a feeling of fullness.  · You have fatigue.  · You feel generally sick (malaise).  · You have frequent infections.  Get help right away if:  · You have redness, swelling, or pain at the injury site.  · You have fluid or blood coming from the injury site.  · You have pus or a bad smell coming from the injury site.  · Your skin feels warm to the touch.  · You have a fever.  This information is not intended to replace advice given to you by your health care provider. Make sure you discuss any questions you have with your health care provider.  Document Released: 12/18/2006 Document Revised: 05/22/2017 Document Reviewed: 07/06/2016  Elsevier Interactive Patient Education © 2017 Elsevier Inc.

## 2018-07-28 NOTE — ED NOTES
Charge RN: Lab called for update on results. Per , the machine is down for nightly maintenance and will be down for another 30min

## 2018-08-07 ENCOUNTER — OCCUPATIONAL MEDICINE (OUTPATIENT)
Dept: OCCUPATIONAL MEDICINE | Facility: CLINIC | Age: 33
End: 2018-08-07
Payer: COMMERCIAL

## 2018-08-07 VITALS
WEIGHT: 231 LBS | SYSTOLIC BLOOD PRESSURE: 130 MMHG | TEMPERATURE: 98.4 F | BODY MASS INDEX: 30.62 KG/M2 | OXYGEN SATURATION: 95 % | HEIGHT: 73 IN | DIASTOLIC BLOOD PRESSURE: 90 MMHG | HEART RATE: 94 BPM

## 2018-08-07 DIAGNOSIS — Z77.21 EXPOSURE TO BLOOD OR BODY FLUID: ICD-10-CM

## 2018-08-07 PROCEDURE — 99202 OFFICE O/P NEW SF 15 MIN: CPT | Performed by: PREVENTIVE MEDICINE

## 2018-08-07 NOTE — LETTER
[address],   [address] [address]  Phone:  [phone] - Fax:  [phone]   [facility] Progress Report and Disability Certification  Date of Service: 8/7/2018   No Show:  No  Date / Time of Next Visit:  MMI   Claim Information   Patient Name: Mak Miranda  Claim Number:     Employer: [employer]  Date of Injury: 7/27/2018     Insurer / TPA: Workers Choice  ID / SSN:     Occupation: Cat Scan Tech  Diagnosis: The encounter diagnosis was Exposure to blood or body fluid.    Medical Information   Related to Industrial Injury? Yes    Subjective Complaints:  DOI 7/27/2018: 32 yo male presents for follow-up regarding needlestick injury.  He sustained a small cut to his left index finger with a used scalpel.  He was seen in the ER, baseline labs are drawn.  Source labs were drawn and found to be negative.  Currently asymptomatic.   Objective Findings: Constitutional: He is oriented to person, place, and time. He appears well-developed and well-nourished.   HENT: Normocephalic and atraumatic. EOM are normal. No scleral icterus.   Cardiovascular: Normal rate.    Pulmonary/Chest: Effort normal.  Neurological: He is alert and oriented to person, place, and time.   Skin: Skin is warm and dry.   Psychiatric: He has a normal mood and affect. His behavior is normal.      Pre-Existing Condition(s):     Assessment:   Condition Improved    Status: Discharged /  MMI  Permanent Disability:No    Plan:      Diagnostics:      Comments:  Source labs are negative for HIV, hepatitis B and hepatitis C  Baseline labs within normal limits.  Negative for HIV, hepatitis B and hepatitis C  Given negative source no further follow-up or testing recommended  Follow-up as needed    Disability Information   Status: Released to Full Duty    From:  8/7/2018  Through:   Restrictions are:     Physical Restrictions   Sitting:    Standing:    Stooping:    Bending:     Squatting:    Walking:    Climbing:    Pushing:      Pulling:    Other:    Reaching Above Shoulder (L):   Reaching Above Shoulder (R):       Reaching Below Shoulder (L):    Reaching Below Shoulder (R):      Not to exceed Weight Limits   Carrying(hrs):   Weight Limit(lb):   Lifting(hrs):   Weight  Limit(lb):     Comments:      Repetitive Actions   Hands: i.e. Fine Manipulations from Grasping:     Feet: i.e. Operating Foot Controls:     Driving / Operate Machinery:     Physician Name: Jarrod Francois D.O. Physician Signature: JARROD Meyer D.O. e-Signature: Dr. David Tellez, Medical Director   Clinic Name / Location: [address],   [address] [place] [address] Clinic Phone Number: Dept: [phone]   Appointment Time: 1:15 Pm Visit Start Time: 1:05 PM   Check-In Time:  1:03 Pm Visit Discharge Time:  1:19 PM   Original-Treating Physician or Chiropractor    Page 2-Insurer/TPA    Page 3-Employer    Page 4-Employee

## 2018-08-07 NOTE — PROGRESS NOTES
"Subjective:      Mak Miranda is a 33 y.o. male who presents with Follow-Up (WC DOI 07/27/2018 - Lab Results - ROOM )      DOI 7/27/2018: 32 yo male presents for follow-up regarding needlestick injury.  He sustained a small cut to his left index finger with a used scalpel.  He was seen in the ER, baseline labs are drawn.  Source labs were drawn and found to be negative.  Currently asymptomatic.     HPI    ROS  ROS: All systems were reviewed on intake form, form was reviewed and signed. See scanned documents in media. Pertinent positives and negatives included in HPI.    PMH: No pertinent past medical history to this problem  MEDS: Medications were reviewed in Epic  ALLERGIES:   Allergies   Allergen Reactions   • Sulfa Drugs      As infant; doesn't remember rxn     SOCHX: Works as an RN at [employer]   FH: No pertinent family history to this problem     Objective:     /90   Pulse 94   Temp 36.9 °C (98.4 °F)   Ht 1.854 m (6' 1\")   Wt 104.8 kg (231 lb)   SpO2 95%   BMI 30.48 kg/m²      Physical Exam    Constitutional: He is oriented to person, place, and time. He appears well-developed and well-nourished.   HENT: Normocephalic and atraumatic. EOM are normal. No scleral icterus.   Cardiovascular: Normal rate.    Pulmonary/Chest: Effort normal.  Neurological: He is alert and oriented to person, place, and time.   Skin: Skin is warm and dry.   Psychiatric: He has a normal mood and affect. His behavior is normal.          Assessment/Plan:     1. Exposure to blood or body fluid  Source labs are negative for HIV, hepatitis B and hepatitis C   Baseline labs within normal limits.  Negative for HIV, hepatitis B and hepatitis C   Given negative source no further follow-up or testing recommended   Follow-up as needed       "

## 2018-11-01 ENCOUNTER — OFFICE VISIT (OUTPATIENT)
Dept: BEHAVIORAL HEALTH | Facility: CLINIC | Age: 33
End: 2018-11-01
Payer: COMMERCIAL

## 2018-11-01 DIAGNOSIS — F41.1 GAD (GENERALIZED ANXIETY DISORDER): ICD-10-CM

## 2018-11-01 DIAGNOSIS — F42.8 OTHER OBSESSIVE-COMPULSIVE DISORDERS: ICD-10-CM

## 2018-11-01 PROCEDURE — 90791 PSYCH DIAGNOSTIC EVALUATION: CPT | Performed by: SOCIAL WORKER
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
## 2018-11-09 ENCOUNTER — EH NON-PROVIDER (OUTPATIENT)
Dept: OCCUPATIONAL MEDICINE | Facility: CLINIC | Age: 33
End: 2018-11-09

## 2018-11-09 DIAGNOSIS — Z23 NEED FOR VACCINATION: Primary | ICD-10-CM

## 2018-11-09 PROCEDURE — 90686 IIV4 VACC NO PRSV 0.5 ML IM: CPT | Performed by: PREVENTIVE MEDICINE

## 2018-12-05 ENCOUNTER — APPOINTMENT (OUTPATIENT)
Dept: BEHAVIORAL HEALTH | Facility: CLINIC | Age: 33
End: 2018-12-05
Payer: COMMERCIAL

## 2018-12-19 ENCOUNTER — APPOINTMENT (OUTPATIENT)
Dept: BEHAVIORAL HEALTH | Facility: CLINIC | Age: 33
End: 2018-12-19
Payer: COMMERCIAL

## 2019-01-23 ENCOUNTER — OFFICE VISIT (OUTPATIENT)
Dept: MEDICAL GROUP | Facility: MEDICAL CENTER | Age: 34
End: 2019-01-23
Payer: COMMERCIAL

## 2019-01-23 VITALS
WEIGHT: 240 LBS | TEMPERATURE: 96 F | HEIGHT: 73 IN | SYSTOLIC BLOOD PRESSURE: 132 MMHG | HEART RATE: 94 BPM | OXYGEN SATURATION: 95 % | DIASTOLIC BLOOD PRESSURE: 70 MMHG | RESPIRATION RATE: 16 BRPM | BODY MASS INDEX: 31.81 KG/M2

## 2019-01-23 DIAGNOSIS — J01.00 ACUTE NON-RECURRENT MAXILLARY SINUSITIS: ICD-10-CM

## 2019-01-23 PROBLEM — R06.02 SHORTNESS OF BREATH: Status: ACTIVE | Noted: 2019-01-23

## 2019-01-23 PROCEDURE — 99214 OFFICE O/P EST MOD 30 MIN: CPT | Performed by: FAMILY MEDICINE

## 2019-01-23 RX ORDER — FLUTICASONE PROPIONATE 50 MCG
2 SPRAY, SUSPENSION (ML) NASAL DAILY
Qty: 16 G | Refills: 3 | Status: SHIPPED | OUTPATIENT
Start: 2019-01-23 | End: 2020-09-02

## 2019-01-23 RX ORDER — AMOXICILLIN 875 MG/1
875 TABLET, COATED ORAL 2 TIMES DAILY
Qty: 20 TAB | Refills: 0 | Status: SHIPPED | OUTPATIENT
Start: 2019-01-23 | End: 2019-05-06 | Stop reason: SDUPTHER

## 2019-01-23 ASSESSMENT — PATIENT HEALTH QUESTIONNAIRE - PHQ9: CLINICAL INTERPRETATION OF PHQ2 SCORE: 0

## 2019-01-23 NOTE — PROGRESS NOTES
"Subjective:   Mak Miranda is a 33 y.o. male here today for sinusitis    Acute non-recurrent maxillary sinusitis  Started a couple months ago with a URI. Waking up at night with cough, croupy cough and shortness of breath. Feels like he is not getting enough air in his lung. There is clear mucus on occasion. Tasting blood sometimes. There is constant congestion and pressure of sinus.   NyQuil helps slightly.  No wheezing. No GERD. No fever. + post nasal drainage.         Current medicines (including changes today)  Current Outpatient Prescriptions   Medication Sig Dispense Refill   • fluticasone (FLONASE) 50 MCG/ACT nasal spray Spray 2 Sprays in nose every day. 16 g 3   • amoxicillin (AMOXIL) 875 MG tablet Take 1 Tab by mouth 2 times a day for 10 days. 20 Tab 0     No current facility-administered medications for this visit.      He  has a past medical history of Anxiety; Arthritis; Bipolar 1 disorder, mixed ([facility]) (7/1/2014); Hypertension; Oppositional defiant disorder; and Pain.    ROS   See HPI       Objective:     Blood pressure 132/70, pulse 94, temperature (!) 35.6 °C (96 °F), resp. rate 16, height 1.854 m (6' 1\"), weight 108.9 kg (240 lb), SpO2 95 %. Body mass index is 31.66 kg/m².   Physical Exam:  Constitutional: Alert, no distress.  Skin: Warm, dry, good turgor, no rashes in visible areas.  Eye: Equal, round and reactive, conjunctiva clear, lids normal.  ENMT: Lips without lesions, good dentition, oropharynx clear.  Positive bilateral thick nasal secretions  Neck: Trachea midline, no masses, no thyromegaly. No cervical or supraclavicular lymphadenopathy  Respiratory: Unlabored respiratory effort, lungs clear to auscultation, no wheezes, no ronchi.  Cardiovascular: Normal S1, S2, no murmur, no edema.  Psych: Alert and oriented x3, normal affect and mood.        Assessment and Plan:   The following treatment plan was discussed    1. Acute non-recurrent maxillary sinusitis  New problem to me.  Most " likely  symptoms are caused by postnasal drip, prescription for amoxicillin and Flonase.  If no improvement consider Atrovent nasal.  - fluticasone (FLONASE) 50 MCG/ACT nasal spray; Spray 2 Sprays in nose every day.  Dispense: 16 g; Refill: 3  - amoxicillin (AMOXIL) 875 MG tablet; Take 1 Tab by mouth 2 times a day for 10 days.  Dispense: 20 Tab; Refill: 0      Followup: Return if symptoms worsen or fail to improve.

## 2019-01-26 ENCOUNTER — PATIENT MESSAGE (OUTPATIENT)
Dept: MEDICAL GROUP | Facility: MEDICAL CENTER | Age: 34
End: 2019-01-26

## 2019-01-26 DIAGNOSIS — J01.00 ACUTE NON-RECURRENT MAXILLARY SINUSITIS: ICD-10-CM

## 2019-01-28 RX ORDER — IPRATROPIUM BROMIDE 42 UG/1
2 SPRAY, METERED NASAL 4 TIMES DAILY
Qty: 1 BOTTLE | Refills: 0 | Status: SHIPPED | OUTPATIENT
Start: 2019-01-28 | End: 2019-02-15 | Stop reason: SDUPTHER

## 2019-01-28 NOTE — TELEPHONE ENCOUNTER
From: Mak Miranda  To: Fito Matthews M.D.  Sent: 1/26/2019 3:24 AM PST  Subject: Non-Urgent Medical Question    Is there anything else you can prescribe me? It's 3 am I've been up since 2 hacking up a lung. I know I just got my meds but I don't seem to be getting better. I have to go to work tomorrow and the next day and I don't seem to get more than 1 to 2 hours of sleep in between coughing fits

## 2019-01-30 ENCOUNTER — PATIENT MESSAGE (OUTPATIENT)
Dept: MEDICAL GROUP | Facility: MEDICAL CENTER | Age: 34
End: 2019-01-30

## 2019-01-30 DIAGNOSIS — R05.9 COUGH: ICD-10-CM

## 2019-01-31 RX ORDER — ALBUTEROL SULFATE 90 UG/1
2 AEROSOL, METERED RESPIRATORY (INHALATION) EVERY 6 HOURS PRN
Qty: 8.5 G | Refills: 1 | Status: SHIPPED | OUTPATIENT
Start: 2019-01-31 | End: 2020-09-02

## 2019-01-31 RX ORDER — CODEINE PHOSPHATE AND GUAIFENESIN 10; 100 MG/5ML; MG/5ML
5 SOLUTION ORAL EVERY 4 HOURS PRN
Qty: 200 ML | Refills: 0 | Status: SHIPPED
Start: 2019-01-31 | End: 2019-02-05 | Stop reason: SDUPTHER

## 2019-01-31 NOTE — TELEPHONE ENCOUNTER
From: Mak Miranda  To: Fito Matthews M.D.  Sent: 1/30/2019 7:53 PM PST  Subject: Non-Urgent Medical Question    I'm so sorry to keep bugging you but I need sleep! What would you think about Phenergan / cough medicine with codeine / an Albuterol inhaler. I need sleep man this new nasal spray isn't helping. Could it be something other than post nasal drip? I'm coughing up a LOT of clear fluid and mucus. I'm averaging 3 hours of sleep or less. Last night was up at 2 and coughed until 430 when it finally stopped then started up again at 6. I'm sorry but I'm slightly losing my mind from the lack of sleep.

## 2019-02-03 ENCOUNTER — PATIENT MESSAGE (OUTPATIENT)
Dept: MEDICAL GROUP | Facility: MEDICAL CENTER | Age: 34
End: 2019-02-03

## 2019-02-03 DIAGNOSIS — R05.9 COUGH: ICD-10-CM

## 2019-02-04 ENCOUNTER — HOSPITAL ENCOUNTER (OUTPATIENT)
Dept: RADIOLOGY | Facility: MEDICAL CENTER | Age: 34
End: 2019-02-04
Attending: FAMILY MEDICINE
Payer: COMMERCIAL

## 2019-02-04 DIAGNOSIS — R05.9 COUGH: ICD-10-CM

## 2019-02-04 PROCEDURE — 71046 X-RAY EXAM CHEST 2 VIEWS: CPT

## 2019-02-04 NOTE — TELEPHONE ENCOUNTER
From: Mak Miranda  To: Fito Matthews M.D.  Sent: 2/3/2019 1:28 PM PST  Subject: RE: Non-Urgent Medical Question    My cough is getting worse. I've lost my appettite almost completely and have to force myself to eat. I was chatting with some of the docs in the ER at work, maybe I should get a chest x-ray? I don't know what else to do I'm sick all the time now and can't even laugh without hacking up a lung.     ----- Message -----  From: Fito Matthews M.D.  Sent: 1/31/19 7:32 AM  To: Mak Miranda  Subject: RE: Non-Urgent Medical Question    Mak,    Sorry to hear about the cough still.  We will send in a prescription for codeine cough syrup and albuterol inhaler to [facility].    Dr. Matthews    ----- Message -----   From: Mak Miranda   Sent: 1/30/2019 7:53 PM PST   To: Fito Matthews M.D.  Subject: Non-Urgent Medical Question    I'm so sorry to keep bugging you but I need sleep! What would you think about Phenergan / cough medicine with codeine / an Albuterol inhaler. I need sleep man this new nasal spray isn't helping. Could it be something other than post nasal drip? I'm coughing up a LOT of clear fluid and mucus. I'm averaging 3 hours of sleep or less. Last night was up at 2 and coughed until 430 when it finally stopped then started up again at 6. I'm sorry but I'm slightly losing my mind from the lack of sleep.

## 2019-02-05 ENCOUNTER — OFFICE VISIT (OUTPATIENT)
Dept: MEDICAL GROUP | Facility: MEDICAL CENTER | Age: 34
End: 2019-02-05
Payer: COMMERCIAL

## 2019-02-05 VITALS
WEIGHT: 243 LBS | SYSTOLIC BLOOD PRESSURE: 126 MMHG | DIASTOLIC BLOOD PRESSURE: 72 MMHG | BODY MASS INDEX: 32.2 KG/M2 | HEIGHT: 73 IN | OXYGEN SATURATION: 96 % | HEART RATE: 78 BPM | TEMPERATURE: 97.8 F

## 2019-02-05 DIAGNOSIS — R05.9 COUGH: ICD-10-CM

## 2019-02-05 PROBLEM — J01.00 ACUTE NON-RECURRENT MAXILLARY SINUSITIS: Status: RESOLVED | Noted: 2019-01-23 | Resolved: 2019-02-05

## 2019-02-05 PROCEDURE — 99214 OFFICE O/P EST MOD 30 MIN: CPT | Performed by: FAMILY MEDICINE

## 2019-02-05 RX ORDER — CODEINE PHOSPHATE AND GUAIFENESIN 10; 100 MG/5ML; MG/5ML
5 SOLUTION ORAL EVERY 4 HOURS PRN
Qty: 200 ML | Refills: 0 | Status: SHIPPED
Start: 2019-02-05 | End: 2019-02-15

## 2019-02-05 RX ORDER — PREDNISONE 20 MG/1
40 TABLET ORAL DAILY
Qty: 10 TAB | Refills: 0 | Status: SHIPPED | OUTPATIENT
Start: 2019-02-05 | End: 2019-02-10

## 2019-02-05 NOTE — PROGRESS NOTES
"Subjective:   Mak Miranda is a 33 y.o. male here today for cough    Wakes up in the middle of the night with cough and using large towels with mucus drainage. Cough symptoms are even present during the day. Laughing makes him cough. Taking zyrtec and claritin. The cough medication helped a lot. Feels like somebody is sitting on his chest, CXR done yesterday was normal. Taking hot bathes help him breath. Albuterol helps when he gets short of breath from his coughing. Using flonase and atrovent nasal, not sure how much it is helping. Having nasal congestion and rhinorrhea. He was treated for sinusitis with amoxicillin that was 2 weeks ago.      Current medicines (including changes today)  Current Outpatient Prescriptions   Medication Sig Dispense Refill   • predniSONE (DELTASONE) 20 MG Tab Take 2 Tabs by mouth every day for 5 days. 10 Tab 0   • guaifenesin-codeine (CHERATUSSIN AC) Solution oral solution Take 5 mL by mouth every four hours as needed for Cough for up to 10 days. 200 mL 0   • albuterol 108 (90 Base) MCG/ACT Aero Soln inhalation aerosol Inhale 2 Puffs by mouth every 6 hours as needed for Shortness of Breath. 8.5 g 1   • ipratropium (ATROVENT) 0.06 % Solution Spray 2 Sprays in nose 4 times a day. 1 Bottle 0   • fluticasone (FLONASE) 50 MCG/ACT nasal spray Spray 2 Sprays in nose every day. 16 g 3     No current facility-administered medications for this visit.      He  has a past medical history of Anxiety; Arthritis; Bipolar 1 disorder, mixed ([facility]) (7/1/2014); Hypertension; Oppositional defiant disorder; and Pain.    ROS   No fever, no GERD, no wheezing       Objective:     Blood pressure 126/72, pulse 78, temperature 36.6 °C (97.8 °F), height 1.854 m (6' 1\"), weight 110.2 kg (243 lb), SpO2 96 %. Body mass index is 32.06 kg/m².   Physical Exam:  Constitutional: Alert, no distress.  Skin: Warm, dry, good turgor, no rashes in visible areas.  Eye: Equal, round and reactive, conjunctiva clear, lids " normal.  ENMT: Lips without lesions, good dentition, oropharynx clear. Thick clear nasal drainage bilaterally.  Neck: Trachea midline, no masses, no thyromegaly. No cervical or supraclavicular lymphadenopathy  Respiratory: Unlabored respiratory effort, lungs clear to auscultation, no wheezes, no ronchi.  Cardiovascular: Normal S1, S2, no murmur, no edema.  Psych: Alert and oriented x3, normal affect and mood.        Assessment and Plan:   The following treatment plan was discussed    1. Cough  New problem.  Most likely related to postnasal drainage.  Prescription for prednisone and refill cough medication.  If no improvement consider Astelin nasal spray.  - predniSONE (DELTASONE) 20 MG Tab; Take 2 Tabs by mouth every day for 5 days.  Dispense: 10 Tab; Refill: 0  - guaifenesin-codeine (CHERATUSSIN AC) Solution oral solution; Take 5 mL by mouth every four hours as needed for Cough for up to 10 days.  Dispense: 200 mL; Refill: 0      Followup: Return if symptoms worsen or fail to improve.

## 2019-02-15 DIAGNOSIS — J01.00 ACUTE NON-RECURRENT MAXILLARY SINUSITIS: ICD-10-CM

## 2019-02-15 RX ORDER — IPRATROPIUM BROMIDE 42 UG/1
SPRAY, METERED NASAL
Qty: 15 ML | Refills: 2 | Status: SHIPPED | OUTPATIENT
Start: 2019-02-15 | End: 2019-03-09

## 2019-03-09 DIAGNOSIS — J01.00 ACUTE NON-RECURRENT MAXILLARY SINUSITIS: ICD-10-CM

## 2019-03-09 RX ORDER — IPRATROPIUM BROMIDE 42 UG/1
SPRAY, METERED NASAL
Qty: 1 BOTTLE | Refills: 5 | Status: SHIPPED | OUTPATIENT
Start: 2019-03-09 | End: 2020-09-02

## 2019-05-04 ENCOUNTER — PATIENT MESSAGE (OUTPATIENT)
Dept: MEDICAL GROUP | Facility: MEDICAL CENTER | Age: 34
End: 2019-05-04

## 2019-05-04 DIAGNOSIS — J01.00 ACUTE NON-RECURRENT MAXILLARY SINUSITIS: ICD-10-CM

## 2019-05-06 RX ORDER — AMOXICILLIN 875 MG/1
875 TABLET, COATED ORAL 2 TIMES DAILY
Qty: 20 TAB | Refills: 0 | Status: SHIPPED | OUTPATIENT
Start: 2019-05-06 | End: 2019-05-16

## 2019-05-06 NOTE — TELEPHONE ENCOUNTER
From: Mak Miranda  To: Fito Matthews M.D.  Sent: 5/4/2019 10:00 PM PDT  Subject: Prescription Question    I'm starting to get another one of my ear infections from swimming. Can I get a few refills of amoxicillin for the summer swim season? It's the only thing that works! Thank you!

## 2019-06-24 LAB
AMP AMPHETAMINE: NORMAL
BAR BARBITURATES: NORMAL
BREATH ALCOHOL COMMENT: NORMAL
BZO BENZODIAZEPINES: NORMAL
COC COCAINE: NORMAL
INT CON NEG: NORMAL
INT CON POS: NORMAL
MDMA ECSTASY: NORMAL
MET METHAMPHETAMINES: NORMAL
MTD METHADONE: NORMAL
OPI OPIATES: NORMAL
OXY OXYCODONE: NORMAL
PCP PHENCYCLIDINE: NORMAL
POC BREATHALIZER: 0 PERCENT (ref 0–0.01)
POC URINE DRUG SCREEN OCDRS: NORMAL
THC: NORMAL

## 2020-04-17 ENCOUNTER — OFFICE VISIT (OUTPATIENT)
Dept: URGENT CARE | Facility: CLINIC | Age: 35
End: 2020-04-17

## 2020-04-17 ENCOUNTER — NON-PROVIDER VISIT (OUTPATIENT)
Dept: URGENT CARE | Facility: CLINIC | Age: 35
End: 2020-04-17

## 2020-04-17 VITALS
HEIGHT: 72 IN | SYSTOLIC BLOOD PRESSURE: 118 MMHG | OXYGEN SATURATION: 96 % | TEMPERATURE: 97.6 F | HEART RATE: 86 BPM | WEIGHT: 253.53 LBS | DIASTOLIC BLOOD PRESSURE: 86 MMHG | RESPIRATION RATE: 12 BRPM | BODY MASS INDEX: 34.34 KG/M2

## 2020-04-17 DIAGNOSIS — Z02.1 PRE-EMPLOYMENT DRUG SCREENING: Primary | ICD-10-CM

## 2020-04-17 DIAGNOSIS — Z02.1 PRE-EMPLOYMENT HEALTH SCREENING EXAMINATION: ICD-10-CM

## 2020-04-17 LAB
AMP AMPHETAMINE: NORMAL
BAR BARBITURATES: NORMAL
BZO BENZODIAZEPINES: NORMAL
COC COCAINE: NORMAL
INT CON NEG: NORMAL
INT CON POS: NORMAL
MDMA ECSTASY: NORMAL
MET METHAMPHETAMINES: NORMAL
MTD METHADONE: NORMAL
OPI OPIATES: NORMAL
OXY OXYCODONE: NORMAL
PCP PHENCYCLIDINE: NORMAL
POC URINE DRUG SCREEN OCDRS: NEGATIVE
THC: NORMAL

## 2020-04-17 PROCEDURE — 8915 PR COMPREHENSIVE PHYSICAL: Performed by: PREVENTIVE MEDICINE

## 2020-04-17 PROCEDURE — 94375 RESPIRATORY FLOW VOLUME LOOP: CPT | Performed by: PREVENTIVE MEDICINE

## 2020-04-17 PROCEDURE — 80305 DRUG TEST PRSMV DIR OPT OBS: CPT | Performed by: PREVENTIVE MEDICINE

## 2020-04-17 ASSESSMENT — FIBROSIS 4 INDEX: FIB4 SCORE: 0.45

## 2020-09-02 ENCOUNTER — OFFICE VISIT (OUTPATIENT)
Dept: MEDICAL GROUP | Facility: MEDICAL CENTER | Age: 35
End: 2020-09-02
Payer: COMMERCIAL

## 2020-09-02 VITALS
DIASTOLIC BLOOD PRESSURE: 80 MMHG | SYSTOLIC BLOOD PRESSURE: 120 MMHG | TEMPERATURE: 97.9 F | OXYGEN SATURATION: 96 % | WEIGHT: 249 LBS | HEART RATE: 83 BPM | HEIGHT: 72 IN | BODY MASS INDEX: 33.72 KG/M2

## 2020-09-02 DIAGNOSIS — L29.0 ANAL ITCHING: ICD-10-CM

## 2020-09-02 PROCEDURE — 99214 OFFICE O/P EST MOD 30 MIN: CPT | Performed by: FAMILY MEDICINE

## 2020-09-02 RX ORDER — HYDROXYZINE HYDROCHLORIDE 25 MG/1
25-50 TABLET, FILM COATED ORAL
Qty: 60 TAB | Refills: 0 | Status: SHIPPED | OUTPATIENT
Start: 2020-09-02 | End: 2020-09-28

## 2020-09-02 RX ORDER — TRIAMCINOLONE ACETONIDE 0.25 MG/G
1 CREAM TOPICAL 2 TIMES DAILY
Qty: 80 G | Refills: 0 | Status: SHIPPED | OUTPATIENT
Start: 2020-09-02 | End: 2020-11-01 | Stop reason: SDUPTHER

## 2020-09-02 NOTE — PROGRESS NOTES
Subjective:   Mak Miranda is a 35 y.o. male here today for anal itching.    About 6 months he came down with tinea cruis, treated with ketoconazole cream and shampoo did not help after 4 weeks. He was then given anti-fungal pill which helped. Since fungal infection started 6 months ago, he is having significant anal itching. He has tried hemorrhoid cream, powders, soaps witch hazel, but the only thing that works is very hot water. Getting worse and BM's make it more painful. Itching can wake him up at night.    Current medicines (including changes today)  No current outpatient medications on file.     No current facility-administered medications for this visit.      He  has a past medical history of Anxiety, Arthritis, Bipolar 1 disorder, mixed ([facility]) (7/1/2014), Hypertension, Oppositional defiant disorder, and Pain.    ROS   No fever, + 1 loose stool daily, no blood stools.       Objective:     /80 (BP Location: Right arm, Patient Position: Sitting)   Pulse 83   Temp 36.6 °C (97.9 °F)   Ht 1.829 m (6')   Wt 112.9 kg (249 lb)   SpO2 96%  Body mass index is 33.77 kg/m².   Physical Exam:  Constitutional: Alert, no distress.  Skin: Warm, dry, good turgor, no rashes in visible areas.  Eye: Equal, round and reactive, conjunctiva clear, lids normal.  Psych: Alert and oriented x3, normal affect and mood.  Rectal: No external lesions or fissures.  Genitals: Normal circumcised penis without any rash or discharge.      Assessment and Plan:   The following treatment plan was discussed    1. Anal itching  New problem.  Possible anal pruritus.  Prescription for triamcinolone cream and hydroxyzine at night.  Advised zinc oxide paste daily.  Advised to avoid excessive wiping and acidic food.  Referral to colorectal specialist if no improvement for evaluation.  - triamcinolone acetonide (KENALOG) 0.025 % Cream; Apply 1 g to affected area(s) 2 times a day. Apply to anus. Do not use more than 2 weeks  Dispense:  80 g; Refill: 0  - hydrOXYzine HCl (ATARAX) 25 MG Tab; Take 1-2 Tabs by mouth at bedtime as needed for Itching.  Dispense: 60 Tab; Refill: 0  - REFERRAL TO COLORECTAL SURGERY      Followup: Return if symptoms worsen or fail to improve.

## 2020-09-18 ENCOUNTER — PATIENT MESSAGE (OUTPATIENT)
Dept: MEDICAL GROUP | Facility: MEDICAL CENTER | Age: 35
End: 2020-09-18

## 2020-09-18 DIAGNOSIS — N48.9 PENIS DISORDER: ICD-10-CM

## 2020-09-21 NOTE — TELEPHONE ENCOUNTER
From: Mak Miranda  To: Fito Matthews M.D.  Sent: 9/18/2020 7:09 PM PDT  Subject: Non-Urgent Medical Question    I have an embarrassing question for you and not sure what to do.     For weeks now my penis has been wanting to go inside of me. I have to constantly adjust it. And it seems to be getting smaller in size. I am not sure what to do because its very uncomfortable and embarrassing.     I did have to have surgery as a baby because I had (intussesseption of the penis?) It was inside and they had to use my foreskin to get it out. Could this be reverting? Thank you

## 2020-11-01 DIAGNOSIS — L29.0 ANAL ITCHING: ICD-10-CM

## 2020-11-03 RX ORDER — TRIAMCINOLONE ACETONIDE 0.25 MG/G
1 CREAM TOPICAL 2 TIMES DAILY
Qty: 80 G | Refills: 0 | Status: SHIPPED | OUTPATIENT
Start: 2020-11-03 | End: 2021-06-30

## 2020-11-03 RX ORDER — HYDROXYZINE HYDROCHLORIDE 25 MG/1
25-50 TABLET, FILM COATED ORAL
Qty: 60 TAB | Refills: 3 | Status: SHIPPED | OUTPATIENT
Start: 2020-11-03 | End: 2021-06-30

## 2020-11-06 ENCOUNTER — PATIENT MESSAGE (OUTPATIENT)
Dept: MEDICAL GROUP | Facility: MEDICAL CENTER | Age: 35
End: 2020-11-06

## 2020-11-07 RX ORDER — ALBUTEROL SULFATE 90 UG/1
2 AEROSOL, METERED RESPIRATORY (INHALATION) EVERY 4 HOURS PRN
Qty: 18 G | Refills: 2 | Status: SHIPPED | OUTPATIENT
Start: 2020-11-07 | End: 2021-06-30

## 2020-12-16 DIAGNOSIS — Z23 NEED FOR VACCINATION: ICD-10-CM

## 2021-06-30 ENCOUNTER — PRE-ADMISSION TESTING (OUTPATIENT)
Dept: ADMISSIONS | Facility: MEDICAL CENTER | Age: 36
End: 2021-06-30
Attending: ORTHOPAEDIC SURGERY
Payer: COMMERCIAL

## 2021-06-30 DIAGNOSIS — Z01.812 PRE-OPERATIVE LABORATORY EXAMINATION: ICD-10-CM

## 2021-06-30 PROCEDURE — U0005 INFEC AGEN DETEC AMPLI PROBE: HCPCS

## 2021-06-30 PROCEDURE — C9803 HOPD COVID-19 SPEC COLLECT: HCPCS

## 2021-06-30 PROCEDURE — U0003 INFECTIOUS AGENT DETECTION BY NUCLEIC ACID (DNA OR RNA); SEVERE ACUTE RESPIRATORY SYNDROME CORONAVIRUS 2 (SARS-COV-2) (CORONAVIRUS DISEASE [COVID-19]), AMPLIFIED PROBE TECHNIQUE, MAKING USE OF HIGH THROUGHPUT TECHNOLOGIES AS DESCRIBED BY CMS-2020-01-R: HCPCS

## 2021-06-30 RX ORDER — IBUPROFEN 800 MG/1
800 TABLET ORAL
COMMUNITY

## 2021-06-30 RX ORDER — ALBUTEROL SULFATE 90 UG/1
AEROSOL, METERED RESPIRATORY (INHALATION)
COMMUNITY
End: 2021-06-30

## 2021-06-30 NOTE — PREPROCEDURE INSTRUCTIONS
Pre admit apt: Pt. Instructed to continue regularly prescribed medications through day before surgery.  Instructed to take the following medications, the day of surgery, with a sip of water per anesthesia protocol: none  Pt will stop anti-inflammatory med today.    Covid test 6/30, pt instructed to avoid large crowds and gatherings and to wear his mask in public.  Pt will report any new sxs of covid or illness to surgeon.    Pt states no previous issues with anesthesia

## 2021-07-01 LAB
SARS-COV-2 RNA RESP QL NAA+PROBE: NOTDETECTED
SPECIMEN SOURCE: NORMAL

## 2021-07-07 ENCOUNTER — ANESTHESIA (OUTPATIENT)
Dept: SURGERY | Facility: MEDICAL CENTER | Age: 36
End: 2021-07-07
Payer: COMMERCIAL

## 2021-07-07 ENCOUNTER — HOSPITAL ENCOUNTER (OUTPATIENT)
Facility: MEDICAL CENTER | Age: 36
End: 2021-07-07
Attending: ORTHOPAEDIC SURGERY | Admitting: ORTHOPAEDIC SURGERY
Payer: COMMERCIAL

## 2021-07-07 ENCOUNTER — APPOINTMENT (OUTPATIENT)
Dept: RADIOLOGY | Facility: MEDICAL CENTER | Age: 36
End: 2021-07-07
Attending: ORTHOPAEDIC SURGERY
Payer: COMMERCIAL

## 2021-07-07 ENCOUNTER — ANESTHESIA EVENT (OUTPATIENT)
Dept: SURGERY | Facility: MEDICAL CENTER | Age: 36
End: 2021-07-07
Payer: COMMERCIAL

## 2021-07-07 VITALS
SYSTOLIC BLOOD PRESSURE: 134 MMHG | HEIGHT: 73 IN | BODY MASS INDEX: 32.34 KG/M2 | DIASTOLIC BLOOD PRESSURE: 82 MMHG | HEART RATE: 100 BPM | WEIGHT: 244.05 LBS | TEMPERATURE: 98.1 F | OXYGEN SATURATION: 94 % | RESPIRATION RATE: 16 BRPM

## 2021-07-07 DIAGNOSIS — M13.821: ICD-10-CM

## 2021-07-07 PROCEDURE — 700102 HCHG RX REV CODE 250 W/ 637 OVERRIDE(OP): Performed by: ANESTHESIOLOGY

## 2021-07-07 PROCEDURE — A9270 NON-COVERED ITEM OR SERVICE: HCPCS | Performed by: ORTHOPAEDIC SURGERY

## 2021-07-07 PROCEDURE — 160048 HCHG OR STATISTICAL LEVEL 1-5: Performed by: ORTHOPAEDIC SURGERY

## 2021-07-07 PROCEDURE — 700105 HCHG RX REV CODE 258: Performed by: ORTHOPAEDIC SURGERY

## 2021-07-07 PROCEDURE — 700101 HCHG RX REV CODE 250: Performed by: ORTHOPAEDIC SURGERY

## 2021-07-07 PROCEDURE — 160009 HCHG ANES TIME/MIN: Performed by: ORTHOPAEDIC SURGERY

## 2021-07-07 PROCEDURE — 700111 HCHG RX REV CODE 636 W/ 250 OVERRIDE (IP): Performed by: ANESTHESIOLOGY

## 2021-07-07 PROCEDURE — 501838 HCHG SUTURE GENERAL: Performed by: ORTHOPAEDIC SURGERY

## 2021-07-07 PROCEDURE — 160029 HCHG SURGERY MINUTES - 1ST 30 MINS LEVEL 4: Performed by: ORTHOPAEDIC SURGERY

## 2021-07-07 PROCEDURE — 73080 X-RAY EXAM OF ELBOW: CPT | Mod: RT

## 2021-07-07 PROCEDURE — 160035 HCHG PACU - 1ST 60 MINS PHASE I: Performed by: ORTHOPAEDIC SURGERY

## 2021-07-07 PROCEDURE — 160046 HCHG PACU - 1ST 60 MINS PHASE II: Performed by: ORTHOPAEDIC SURGERY

## 2021-07-07 PROCEDURE — 160041 HCHG SURGERY MINUTES - EA ADDL 1 MIN LEVEL 4: Performed by: ORTHOPAEDIC SURGERY

## 2021-07-07 PROCEDURE — 160025 RECOVERY II MINUTES (STATS): Performed by: ORTHOPAEDIC SURGERY

## 2021-07-07 PROCEDURE — A9270 NON-COVERED ITEM OR SERVICE: HCPCS | Performed by: ANESTHESIOLOGY

## 2021-07-07 PROCEDURE — 160002 HCHG RECOVERY MINUTES (STAT): Performed by: ORTHOPAEDIC SURGERY

## 2021-07-07 PROCEDURE — 160036 HCHG PACU - EA ADDL 30 MINS PHASE I: Performed by: ORTHOPAEDIC SURGERY

## 2021-07-07 PROCEDURE — 500881 HCHG PACK, EXTREMITY: Performed by: ORTHOPAEDIC SURGERY

## 2021-07-07 RX ORDER — MIDAZOLAM HYDROCHLORIDE 1 MG/ML
INJECTION INTRAMUSCULAR; INTRAVENOUS PRN
Status: DISCONTINUED | OUTPATIENT
Start: 2021-07-07 | End: 2021-07-07 | Stop reason: SURG

## 2021-07-07 RX ORDER — HALOPERIDOL 5 MG/ML
1 INJECTION INTRAMUSCULAR
Status: DISCONTINUED | OUTPATIENT
Start: 2021-07-07 | End: 2021-07-07 | Stop reason: HOSPADM

## 2021-07-07 RX ORDER — METOPROLOL TARTRATE 1 MG/ML
1 INJECTION, SOLUTION INTRAVENOUS
Status: DISCONTINUED | OUTPATIENT
Start: 2021-07-07 | End: 2021-07-07 | Stop reason: HOSPADM

## 2021-07-07 RX ORDER — LABETALOL HYDROCHLORIDE 5 MG/ML
5 INJECTION, SOLUTION INTRAVENOUS
Status: DISCONTINUED | OUTPATIENT
Start: 2021-07-07 | End: 2021-07-07 | Stop reason: HOSPADM

## 2021-07-07 RX ORDER — SODIUM CHLORIDE, SODIUM LACTATE, POTASSIUM CHLORIDE, CALCIUM CHLORIDE 600; 310; 30; 20 MG/100ML; MG/100ML; MG/100ML; MG/100ML
INJECTION, SOLUTION INTRAVENOUS CONTINUOUS
Status: ACTIVE | OUTPATIENT
Start: 2021-07-07 | End: 2021-07-07

## 2021-07-07 RX ORDER — OXYCODONE HYDROCHLORIDE AND ACETAMINOPHEN 5; 325 MG/1; MG/1
1 TABLET ORAL
Status: COMPLETED | OUTPATIENT
Start: 2021-07-07 | End: 2021-07-07

## 2021-07-07 RX ORDER — BUPIVACAINE HYDROCHLORIDE AND EPINEPHRINE 5; 5 MG/ML; UG/ML
INJECTION, SOLUTION PERINEURAL
Status: DISCONTINUED | OUTPATIENT
Start: 2021-07-07 | End: 2021-07-07 | Stop reason: HOSPADM

## 2021-07-07 RX ORDER — DEXAMETHASONE SODIUM PHOSPHATE 4 MG/ML
INJECTION, SOLUTION INTRA-ARTICULAR; INTRALESIONAL; INTRAMUSCULAR; INTRAVENOUS; SOFT TISSUE PRN
Status: DISCONTINUED | OUTPATIENT
Start: 2021-07-07 | End: 2021-07-07 | Stop reason: SURG

## 2021-07-07 RX ORDER — MIDAZOLAM HYDROCHLORIDE 1 MG/ML
1 INJECTION INTRAMUSCULAR; INTRAVENOUS
Status: DISCONTINUED | OUTPATIENT
Start: 2021-07-07 | End: 2021-07-07 | Stop reason: HOSPADM

## 2021-07-07 RX ORDER — HYDROMORPHONE HYDROCHLORIDE 1 MG/ML
0.1 INJECTION, SOLUTION INTRAMUSCULAR; INTRAVENOUS; SUBCUTANEOUS
Status: DISCONTINUED | OUTPATIENT
Start: 2021-07-07 | End: 2021-07-07 | Stop reason: HOSPADM

## 2021-07-07 RX ORDER — ONDANSETRON 2 MG/ML
INJECTION INTRAMUSCULAR; INTRAVENOUS PRN
Status: DISCONTINUED | OUTPATIENT
Start: 2021-07-07 | End: 2021-07-07 | Stop reason: SURG

## 2021-07-07 RX ORDER — HYDROMORPHONE HYDROCHLORIDE 1 MG/ML
0.2 INJECTION, SOLUTION INTRAMUSCULAR; INTRAVENOUS; SUBCUTANEOUS
Status: DISCONTINUED | OUTPATIENT
Start: 2021-07-07 | End: 2021-07-07 | Stop reason: HOSPADM

## 2021-07-07 RX ORDER — CEFAZOLIN SODIUM 1 G/3ML
INJECTION, POWDER, FOR SOLUTION INTRAMUSCULAR; INTRAVENOUS PRN
Status: DISCONTINUED | OUTPATIENT
Start: 2021-07-07 | End: 2021-07-07 | Stop reason: SURG

## 2021-07-07 RX ORDER — MEPERIDINE HYDROCHLORIDE 25 MG/ML
12.5 INJECTION INTRAMUSCULAR; INTRAVENOUS; SUBCUTANEOUS
Status: DISCONTINUED | OUTPATIENT
Start: 2021-07-07 | End: 2021-07-07 | Stop reason: HOSPADM

## 2021-07-07 RX ORDER — HYDROMORPHONE HYDROCHLORIDE 1 MG/ML
0.4 INJECTION, SOLUTION INTRAMUSCULAR; INTRAVENOUS; SUBCUTANEOUS
Status: DISCONTINUED | OUTPATIENT
Start: 2021-07-07 | End: 2021-07-07 | Stop reason: HOSPADM

## 2021-07-07 RX ORDER — METOCLOPRAMIDE HYDROCHLORIDE 5 MG/ML
INJECTION INTRAMUSCULAR; INTRAVENOUS PRN
Status: DISCONTINUED | OUTPATIENT
Start: 2021-07-07 | End: 2021-07-07 | Stop reason: SURG

## 2021-07-07 RX ORDER — OXYCODONE HYDROCHLORIDE AND ACETAMINOPHEN 5; 325 MG/1; MG/1
2 TABLET ORAL
Status: COMPLETED | OUTPATIENT
Start: 2021-07-07 | End: 2021-07-07

## 2021-07-07 RX ORDER — KETOROLAC TROMETHAMINE 30 MG/ML
INJECTION, SOLUTION INTRAMUSCULAR; INTRAVENOUS PRN
Status: DISCONTINUED | OUTPATIENT
Start: 2021-07-07 | End: 2021-07-07 | Stop reason: SURG

## 2021-07-07 RX ORDER — SODIUM CHLORIDE, SODIUM LACTATE, POTASSIUM CHLORIDE, CALCIUM CHLORIDE 600; 310; 30; 20 MG/100ML; MG/100ML; MG/100ML; MG/100ML
INJECTION, SOLUTION INTRAVENOUS CONTINUOUS
Status: DISCONTINUED | OUTPATIENT
Start: 2021-07-07 | End: 2021-07-07 | Stop reason: HOSPADM

## 2021-07-07 RX ORDER — DIPHENHYDRAMINE HYDROCHLORIDE 50 MG/ML
12.5 INJECTION INTRAMUSCULAR; INTRAVENOUS
Status: DISCONTINUED | OUTPATIENT
Start: 2021-07-07 | End: 2021-07-07 | Stop reason: HOSPADM

## 2021-07-07 RX ADMIN — FENTANYL CITRATE 100 MCG: 50 INJECTION, SOLUTION INTRAMUSCULAR; INTRAVENOUS at 10:13

## 2021-07-07 RX ADMIN — SODIUM CHLORIDE, POTASSIUM CHLORIDE, SODIUM LACTATE AND CALCIUM CHLORIDE: 600; 310; 30; 20 INJECTION, SOLUTION INTRAVENOUS at 09:19

## 2021-07-07 RX ADMIN — FENTANYL CITRATE 50 MCG: 50 INJECTION, SOLUTION INTRAMUSCULAR; INTRAVENOUS at 10:31

## 2021-07-07 RX ADMIN — FENTANYL CITRATE 25 MCG: 50 INJECTION, SOLUTION INTRAMUSCULAR; INTRAVENOUS at 12:28

## 2021-07-07 RX ADMIN — FENTANYL CITRATE 50 MCG: 50 INJECTION, SOLUTION INTRAMUSCULAR; INTRAVENOUS at 12:10

## 2021-07-07 RX ADMIN — FENTANYL CITRATE 25 MCG: 50 INJECTION, SOLUTION INTRAMUSCULAR; INTRAVENOUS at 11:03

## 2021-07-07 RX ADMIN — FENTANYL CITRATE 25 MCG: 50 INJECTION, SOLUTION INTRAMUSCULAR; INTRAVENOUS at 12:04

## 2021-07-07 RX ADMIN — ONDANSETRON 4 MG: 2 INJECTION INTRAMUSCULAR; INTRAVENOUS at 10:38

## 2021-07-07 RX ADMIN — POVIDONE IODINE 15 ML: 100 SOLUTION TOPICAL at 09:19

## 2021-07-07 RX ADMIN — FENTANYL CITRATE 25 MCG: 50 INJECTION, SOLUTION INTRAMUSCULAR; INTRAVENOUS at 11:11

## 2021-07-07 RX ADMIN — FENTANYL CITRATE 25 MCG: 50 INJECTION, SOLUTION INTRAMUSCULAR; INTRAVENOUS at 11:25

## 2021-07-07 RX ADMIN — FENTANYL CITRATE 50 MCG: 50 INJECTION, SOLUTION INTRAMUSCULAR; INTRAVENOUS at 12:15

## 2021-07-07 RX ADMIN — CEFAZOLIN 2 G: 330 INJECTION, POWDER, FOR SOLUTION INTRAMUSCULAR; INTRAVENOUS at 10:12

## 2021-07-07 RX ADMIN — OXYCODONE AND ACETAMINOPHEN 1 TABLET: 5; 325 TABLET ORAL at 12:02

## 2021-07-07 RX ADMIN — PROPOFOL 200 MG: 10 INJECTION, EMULSION INTRAVENOUS at 10:13

## 2021-07-07 RX ADMIN — FENTANYL CITRATE 50 MCG: 50 INJECTION, SOLUTION INTRAMUSCULAR; INTRAVENOUS at 10:39

## 2021-07-07 RX ADMIN — METOCLOPRAMIDE 10 MG: 5 INJECTION, SOLUTION INTRAMUSCULAR; INTRAVENOUS at 10:10

## 2021-07-07 RX ADMIN — KETOROLAC TROMETHAMINE 30 MG: 30 INJECTION, SOLUTION INTRAMUSCULAR at 11:33

## 2021-07-07 RX ADMIN — LIDOCAINE HYDROCHLORIDE 0.5 ML: 10 INJECTION, SOLUTION INFILTRATION; PERINEURAL at 09:19

## 2021-07-07 RX ADMIN — DEXAMETHASONE SODIUM PHOSPHATE 8 MG: 4 INJECTION, SOLUTION INTRAMUSCULAR; INTRAVENOUS at 10:10

## 2021-07-07 RX ADMIN — MIDAZOLAM HYDROCHLORIDE 2 MG: 1 INJECTION, SOLUTION INTRAMUSCULAR; INTRAVENOUS at 10:10

## 2021-07-07 RX ADMIN — FENTANYL CITRATE 50 MCG: 50 INJECTION, SOLUTION INTRAMUSCULAR; INTRAVENOUS at 10:49

## 2021-07-07 ASSESSMENT — PAIN SCALES - GENERAL: PAIN_LEVEL: 3

## 2021-07-07 ASSESSMENT — PAIN DESCRIPTION - PAIN TYPE
TYPE: SURGICAL PAIN
TYPE: SURGICAL PAIN

## 2021-07-07 NOTE — ANESTHESIA PREPROCEDURE EVALUATION
Relevant Problems   GI   (positive) GERD (gastroesophageal reflux disease)       Physical Exam    Airway   Mallampati: II  TM distance: >3 FB  Neck ROM: full       Cardiovascular - normal exam  Rhythm: regular  Rate: normal  (-) murmur     Dental - normal exam           Pulmonary - normal exam  Breath sounds clear to auscultation     Abdominal    Neurological - normal exam                 Anesthesia Plan    ASA 2       Plan - general       Airway plan will be LMA          Induction: intravenous    Postoperative Plan: Postoperative administration of opioids is intended.    Pertinent diagnostic labs and testing reviewed    Informed Consent:    Anesthetic plan and risks discussed with patient.    Use of blood products discussed with: patient whom consented to blood products.

## 2021-07-07 NOTE — ANESTHESIA POSTPROCEDURE EVALUATION
Patient: Mak Miranda    Procedure Summary     Date: 07/07/21 Room / Location:  OR  / SURGERY [facility]    Anesthesia Start: 1010 Anesthesia Stop: 1146    Procedure: EXCISION, BONE - RADIAL HEAD AND DISTAL OSTEOPHYTE. (Right Arm Lower) Diagnosis: (ARTHRITIS OF ELBOW RIGHT)    Surgeons: Tobias Varner M.D. Responsible Provider: Tolu Roberts D.O.    Anesthesia Type: general ASA Status: 2          Final Anesthesia Type: general  Last vitals  BP   Blood Pressure: 128/92    Temp   36 °C (96.8 °F)    Pulse   84   Resp   16    SpO2   94 %      Anesthesia Post Evaluation    Patient location during evaluation: PACU  Patient participation: complete - patient participated  Level of consciousness: awake and alert  Pain score: 3    Airway patency: patent  Anesthetic complications: no  Cardiovascular status: hemodynamically stable  Respiratory status: acceptable  Hydration status: euvolemic    PONV: none          No complications documented.

## 2021-07-07 NOTE — ANESTHESIA PROCEDURE NOTES
Airway    Date/Time: 7/7/2021 10:16 AM  Performed by: Tolu Roberts D.O.  Authorized by: Tolu Roberts D.O.     Location:  OR  Urgency:  Elective  Indications for Airway Management:  Anesthesia      Spontaneous Ventilation: absent    Sedation Level:  Deep  Preoxygenated: Yes    Mask Difficulty Assessment:  1 - vent by mask  Final Airway Type:  Supraglottic airway  Final Supraglottic Airway:  Standard LMA    SGA Size:  5  Number of Attempts at Approach:  1

## 2021-07-07 NOTE — OR NURSING
1302- Patient dressed and resting in recliner chair. Reports mild, tolerable pain and no nausea. Right arm surgical dressing c/d/i, immobilizer in place. Family called to bedside.  D/Primitivo to care of family post uneventful stay in PACU 2.

## 2021-07-07 NOTE — DISCHARGE INSTRUCTIONS
ACTIVITY: Rest and take it easy for the first 24 hours.  A responsible adult is recommended to remain with you during that time.  It is normal to feel sleepy.  We encourage you to not do anything that requires balance, judgment or coordination.    MILD FLU-LIKE SYMPTOMS ARE NORMAL. YOU MAY EXPERIENCE GENERALIZED MUSCLE ACHES, THROAT IRRITATION, HEADACHE AND/OR SOME NAUSEA.    FOR 24 HOURS DO NOT:  Drive, operate machinery or run household appliances.  Drink beer or alcoholic beverages.   Make important decisions or sign legal documents.    SPECIAL INSTRUCTIONS:  Follow surgeon's instructions on page 1 of this packet    DIET: To avoid nausea, slowly advance diet as tolerated, avoiding spicy or greasy foods for the first day.  Add more substantial food to your diet according to your physician's instructions.   INCREASE FLUIDS AND FIBER TO AVOID CONSTIPATION.    FOLLOW-UP APPOINTMENT:  A follow-up appointment should be arranged with your doctor; call to schedule.    You should CALL YOUR PHYSICIAN if you develop:  Fever greater than 101 degrees F.  Pain not relieved by medication, or persistent nausea or vomiting.  Excessive bleeding (blood soaking through dressing) or unexpected drainage from the wound.  Extreme redness or swelling around the incision site, drainage of pus or foul smelling drainage.  Inability to urinate or empty your bladder within 8 hours.  Problems with breathing or chest pain.    You should call 911 if you develop problems with breathing or chest pain.  If you are unable to contact your doctor or surgical center, you should go to the nearest emergency room or urgent care center.  Physician's telephone #: [phone]    If any questions arise, call your doctor.  If your doctor is not available, please feel free to call the Surgical Center at [phone]. The Contact Center is open Monday through Friday 7AM to 5PM and may speak to a nurse at [phone], or toll free at [phone].     A  registered nurse may call you a few days after your surgery to see how you are doing after your procedure.    MEDICATIONS: Resume taking daily medication.  Take prescribed pain medication with food.  If no medication is prescribed, you may take non-aspirin pain medication if needed.  PAIN MEDICATION CAN BE VERY CONSTIPATING.  Take a stool softener or laxative such as senokot, pericolace, or milk of magnesia if needed.    Prescription given pre-op.  Last pain medication given at 12:02 p.m. (1 Percocet pill)    If your physician has prescribed pain medication that includes Acetaminophen (Tylenol), do not take additional Acetaminophen (Tylenol) while taking the prescribed medication.    Depression / Suicide Risk    As you are discharged from this [facility] facility, it is important to learn how to keep safe from harming yourself.    Recognize the warning signs:  · Abrupt changes in personality, positive or negative- including increase in energy   · Giving away possessions  · Change in eating patterns- significant weight changes-  positive or negative  · Change in sleeping patterns- unable to sleep or sleeping all the time   · Unwillingness or inability to communicate  · Depression  · Unusual sadness, discouragement and loneliness  · Talk of wanting to die  · Neglect of personal appearance   · Rebelliousness- reckless behavior  · Withdrawal from people/activities they love  · Confusion- inability to concentrate     If you or a loved one observes any of these behaviors or has concerns about self-harm, here's what you can do:  · Talk about it- your feelings and reasons for harming yourself  · Remove any means that you might use to hurt yourself (examples: pills, rope, extension cords, firearm)  · Get professional help from the community (Mental Health, Substance Abuse, psychological counseling)  · Do not be alone:Call your Safe Contact- someone whom you trust who will be there for you.  · Call your local CRISIS  HOTLINE 947-3926 or 650-968-9696  · Call your local Children's Mobile Crisis Response Team [place] (599) 387-5662 or www.[employer]  · Call the toll free National Suicide Prevention Hotlines   · National Suicide Prevention Lifeline 133-306-LBWH (9913)  · National [facility] 800-SUICIDE (126-8353)

## 2021-07-07 NOTE — OR NURSING
1143: To PACU from OR via gurney, sleeping, respirations spontaneous and non-labored via OPA. Icepack applied over c/d/i R elbow surgical dressings. RUE hinged brace insitu and locked extended from OR.  1155: No change.  1201: Rouses spontaneously, c/o RUE pain, plan po and IV analgesia. Pt d/vera own O2, will trial on RA.   1210: medicated IV for increasing pain  1225: Pain decreasing  1240: Pt verbalizes pain control.  No change in surgical site assessment. Meets criteria to transfer to Stage 2.

## 2021-07-07 NOTE — OP REPORT
Date of surgery: 7/7/2021    Preoperative diagnosis:  1-right radiocapitellar osteoarthritis  2-right anterior distal humerus osteophyte  3-right olecranon osteophyte  4-right elbow contracture    Postoperative diagnosis:  1-right radiocapitellar osteoarthritis  2-right anterior elbow loose body  3-right olecranon osteophyte  4-right elbow contracture    Surgery performed:  1-right radial head excision  2-right anterior loose body excision, open measuring 2 cm x 1.5 cm x 1 cm  3-right olecranon osteophyte excision  4-right elbow manipulation under anesthesia new    Surgeon: Tobias Varner MD    Anesthesia: General    Estimated blood loss: 20 mL    Complications: None    Findings: Large anterior elbow loose body measuring 2 cm x 1.5 cm x 1 cm    Tourniquet time: 56 minutes    Tourniquet pressure: 250 mmHg    Indication for procedure: Mak is a very pleasant gentleman who has had a longstanding history of right elbow pain and post traumatic elbow osteoarthritis.  This has been recalcitrant to nonoperative treatments including rest, NSAIDs, tear modifications and steroid injections.  Additionally, he has advanced imaging findings consistent with radiocapitellar arthritis as well as significant osteophyte formation.  Clinically he has significantly limited elbow range of motion and for these reasons the decision was made to taken to the operating room today for the above-mentioned procedures.    Description of procedure: On the date of surgery Mak was seen in the preoperative area where informed consent was obtained with all risks and benefits of the procedure explained and all questions answered.  He wished to proceed with the surgery.  The proper site was marked.  He was subsequently taken to the operating room and placed in the supine position with all bony prominences well-padded.  General endotracheal anesthesia was induced.  A tourniquet was placed on the right upper extremity and it was then prepped and  draped in the usual sterile fashion.    A timeout was performed with all persons in attendance agreed on the proper patient, proper surgical site and proper surgery be performed.    An Esmarch was used to exsanguinate the right upper extremity and the tourniquet was insufflated to 250 mils of mercury.  A curvilinear incision was made in line with the previous surgical incision on the lateral aspect of the elbow.  Sharp and blunt dissection was taken down through subcutaneous tissues.  Bleeding is well controlled using Bovie electrocautery.  The overlying fascia of the mobile wad was then incised longitudinally in line with the muscle fibers.  Sharp and blunt dissection was taken down to the annular ligament which was also incised.  The radial head was then exposed.  I then placed Hohmann retractors around the neck of the radius and then used a saw to perform an osteotomy at the radial neck.  The radial head was then removed.  There is found to be complete loss of capitellar cartilage with eburnation of the capitellum.  There was significant osteophyte formation as well about the ulna and the sigmoid notch and the radial head.    I then bluntly dissected medially to palpate and visualize the osteophytes of the ulnohumeral joint.  It is then I found a significant loose body that was felt to be an osteophyte in preoperative imaging.  This loose body was easily removed without any attachments to surrounding soft tissues.  This measured 2 cm x 1.5 cm x 1 cm.  The ulnohumeral joint was then inspected and there were no significant osteophytes which required excision.  The wound was then thoroughly irrigated copious muscle normal saline.  The overlying fascia was repaired using 2-0 Vicryl.  Subcutaneous tissues were repaired using 3-0 Monocryl.  The skin was then repaired using a Zipline wound closure device.    Attention was then turned to the posterior olecranon osteophytes.  A longitudinal incision was made directly over  the olecranon.  Sharp and blunt dissection was taken down through subcutaneous tissues.  Bleeding was well controlled using Bovie electrocautery.  The triceps tendon was then incised longitudinally in line with the olecranon and I was able to visualize the osteophytes at the tip of the olecranon.  I then used a saw to remove a portion of the osteophyte while taking care to protect the insertion of the triceps tendon to the main portion of the olecranon.  I used fluoroscopy to verify acceptable osteophyte excision.    I then performed a right elbow manipulation under anesthesia which helped to increase the range of motion from lacking 10 degrees to full extension.  After the manipulation I was able to get full extension of the elbow with gentle pressure.  Resting tension of the elbow was lacking roughly 10 degrees which is improved from preop.  I elected not to remove any more olecranon so as to prevent any triceps disruption.    The wound was then thoroughly irrigated copious amounts normal saline.  The triceps was then repaired using 2-0 Vicryl.  Subcutaneous tissues were repaired using 3-0 Monocryl.  The skin was then repaired using staples.  Both wounds were then dressed with Xeroform, 4 x 4's, sterile cast padding and a loosely approximated Coban dressing.  The tourniquet was deflated.  General endotracheal anesthesia was reversed.  He was taken to the PACU in good and stable condition.    Disposition: He will be discharged home on a regular diet.  He will have a 10 pound lifting restriction of the right extremity.  He should apply ice and elevate as much possible for the next 72 hours.  He was provided a hinged elbow brace that was locked out in full extension which I would like him to wear at night to help prevent recurrent contracture.  He may remove the splint during the daytime and work on range of motion exercises.  He was prescribed narcotic pain medication and instructed not to drive or operate machinery  while takes medication.  He will return to clinic in 10 to 14 days for repeat evaluation.

## 2021-07-07 NOTE — ANESTHESIA TIME REPORT
Anesthesia Start and Stop Event Times     Date Time Event    7/7/2021 0853 Ready for Procedure     1010 Anesthesia Start     1146 Anesthesia Stop        Responsible Staff  07/07/21    Name Role Begin End    Tolu Roberts D.O. Anesth 1010 1146        Preop Diagnosis (Free Text):  Pre-op Diagnosis     ARTHRITIS OF ELBOW RIGHT        Preop Diagnosis (Codes):    Post op Diagnosis  Arthritis of elbow, right      Premium Reason  Non-Premium    Comments:

## 2021-08-02 ENCOUNTER — OFFICE VISIT (OUTPATIENT)
Dept: MEDICAL GROUP | Facility: PHYSICIAN GROUP | Age: 36
End: 2021-08-02
Payer: COMMERCIAL

## 2021-08-02 VITALS
HEIGHT: 72 IN | BODY MASS INDEX: 33.64 KG/M2 | DIASTOLIC BLOOD PRESSURE: 82 MMHG | WEIGHT: 248.4 LBS | TEMPERATURE: 98.2 F | OXYGEN SATURATION: 96 % | HEART RATE: 84 BPM | RESPIRATION RATE: 16 BRPM | SYSTOLIC BLOOD PRESSURE: 118 MMHG

## 2021-08-02 DIAGNOSIS — M79.674 TOE PAIN, BILATERAL: ICD-10-CM

## 2021-08-02 DIAGNOSIS — M79.675 TOE PAIN, BILATERAL: ICD-10-CM

## 2021-08-02 DIAGNOSIS — Z76.89 ENCOUNTER TO ESTABLISH CARE WITH NEW DOCTOR: ICD-10-CM

## 2021-08-02 PROBLEM — M24.029 LOOSE BODY IN ELBOW JOINT: Status: ACTIVE | Noted: 2021-08-02

## 2021-08-02 PROBLEM — M13.821: Status: RESOLVED | Noted: 2021-07-07 | Resolved: 2021-08-02

## 2021-08-02 PROBLEM — H93.8X3 EAR PRESSURE, BILATERAL: Status: RESOLVED | Noted: 2017-10-10 | Resolved: 2021-08-02

## 2021-08-02 PROBLEM — K92.0 HEMATEMESIS WITH NAUSEA: Status: RESOLVED | Noted: 2017-06-27 | Resolved: 2021-08-02

## 2021-08-02 PROCEDURE — 99213 OFFICE O/P EST LOW 20 MIN: CPT | Performed by: FAMILY MEDICINE

## 2021-08-02 RX ORDER — CEPHALEXIN 500 MG/1
CAPSULE ORAL
COMMUNITY
End: 2021-08-02

## 2021-08-02 RX ORDER — LAMOTRIGINE 25 MG/1
TABLET ORAL
COMMUNITY
End: 2021-08-02

## 2021-08-02 RX ORDER — BUSPIRONE HYDROCHLORIDE 30 MG/1
TABLET ORAL
COMMUNITY
End: 2021-08-02

## 2021-08-02 RX ORDER — BUSPIRONE HYDROCHLORIDE 10 MG/1
TABLET ORAL
COMMUNITY
End: 2021-08-02

## 2021-08-02 RX ORDER — FLUCONAZOLE 150 MG/1
TABLET ORAL
COMMUNITY
End: 2021-08-02

## 2021-08-02 RX ORDER — HYDROCORTISONE ACETATE 25 MG/1
SUPPOSITORY RECTAL
COMMUNITY
End: 2021-08-02

## 2021-08-02 RX ORDER — CEPHALEXIN 500 MG/1
CAPSULE ORAL
COMMUNITY
Start: 2021-07-11 | End: 2021-08-02

## 2021-08-02 RX ORDER — MELOXICAM 15 MG/1
15 TABLET ORAL DAILY
COMMUNITY

## 2021-08-02 RX ORDER — LAMOTRIGINE 150 MG/1
TABLET ORAL
COMMUNITY
End: 2021-08-02

## 2021-08-02 RX ORDER — LAMOTRIGINE 200 MG/1
TABLET ORAL
COMMUNITY
End: 2021-08-02

## 2021-08-02 RX ORDER — HYDROCODONE BITARTRATE AND ACETAMINOPHEN 5; 325 MG/1; MG/1
TABLET ORAL
COMMUNITY
End: 2021-08-02

## 2021-08-02 RX ORDER — HYDROCODONE BITARTRATE AND ACETAMINOPHEN 5; 325 MG/1; MG/1
TABLET ORAL
COMMUNITY
Start: 2021-07-07 | End: 2021-08-02

## 2021-08-02 RX ORDER — AMOXICILLIN 875 MG/1
TABLET, COATED ORAL
COMMUNITY
End: 2021-08-02

## 2021-08-02 RX ORDER — AMITRIPTYLINE HYDROCHLORIDE 10 MG/1
TABLET, FILM COATED ORAL
COMMUNITY
End: 2021-08-02

## 2021-08-02 ASSESSMENT — PATIENT HEALTH QUESTIONNAIRE - PHQ9: CLINICAL INTERPRETATION OF PHQ2 SCORE: 0

## 2021-08-02 NOTE — ASSESSMENT & PLAN NOTE
This is a chronic problem.  Patient states he gets a feeling like glass is in his toes.  He tried a chemical peel that his wife had but that did not help either.  He has not had any redness or swelling.  Someone told him he might be gout so he is been trying cherry juice to no avail.  He also sometimes gets some numbness and tingling on the end of his fingers.  No history of any neuropathy in his family.

## 2021-08-02 NOTE — ASSESSMENT & PLAN NOTE
Patient is here today to establish care.  States he has not had a primary care since Dr. Matthews left.  He just is recovering from surgery on his right elbow so that still very painful.  Is also had some issues with his toes would like to discuss.

## 2021-08-02 NOTE — PROGRESS NOTES
Subjective:     CC: Patient is here to establish care has a few issues to discuss.    HPI:   Mak presents today with the following medical concerns:    Encounter to establish care with new doctor  Patient is here today to establish care.  States he has not had a primary care since Dr. Matthews left.  He just is recovering from surgery on his right elbow so that still very painful.  Is also had some issues with his toes would like to discuss.    Toe pain, bilateral  This is a chronic problem.  Patient states he gets a feeling like glass is in his toes.  He tried a chemical peel that his wife had but that did not help either.  He has not had any redness or swelling.  Someone told him he might be gout so he is been trying cherry juice to no avail.  He also sometimes gets some numbness and tingling on the end of his fingers.  No history of any neuropathy in his family.      Past Medical History:   Diagnosis Date   • Anxiety    • Arthritis      elbow right- osteo   • Bipolar 1 disorder, mixed ([facility]) 7/1/2014    anxiety and depression   • Heart burn    • High cholesterol    • Hypertension    • Oppositional defiant disorder    • Pain     stomach long term       Social History     Tobacco Use   • Smoking status: Never Smoker   • Smokeless tobacco: Never Used   Vaping Use   • Vaping Use: Never used   Substance Use Topics   • Alcohol use: No     Alcohol/week: 0.6 oz     Types: 1 Cans of beer per week     Comment: 1 drink per week   • Drug use: Not Currently     Types: Marijuana       Current Outpatient Medications Ordered in Epic   Medication Sig Dispense Refill   • meloxicam (MOBIC) 15 MG tablet Take 15 mg by mouth every day.     • ibuprofen (MOTRIN) 800 MG Tab Take 800 mg by mouth 1 time a day as needed.       No current Epic-ordered facility-administered medications on file.       Allergies:  Sulfa drugs    Health Maintenance: Completed    ROS:  Gen: no fevers/chills, no changes in weight  Eyes: no changes in vision  ENT: no  "sore throat, no hearing loss, no bloody nose  Pulm: no sob, no cough  CV: no chest pain, no palpitations  GI: no nausea/vomiting, no diarrhea  : no dysuria  MSk: no myalgias  Skin: no rash  Neuro: no headaches, no numbness/tingling  Heme/Lymph: no easy bruising      Objective:       Exam:  /82 (BP Location: Left arm, Patient Position: Sitting, BP Cuff Size: Large adult)   Pulse 84   Temp 36.8 °C (98.2 °F) (Temporal)   Resp 16   Ht 1.822 m (5' 11.75\")   Wt 113 kg (248 lb 6.4 oz)   SpO2 96%   BMI 33.92 kg/m²  Body mass index is 33.92 kg/m².    Gen: Alert and oriented, No apparent distress.  Ext: No clubbing, cyanosis, edema.  There is superficial peeling of the medial aspect of both of his great toes where he had a chemical peel applied.  No evidence of swelling in his joints.  No evidence of gout.  Normal range of motion of the toes.      Assessment & Plan:     36 y.o. male with the following -     1. Toe pain, bilateral  This is a chronic problem.  Patient was told he needs to apply lotion or oils to allow the skin to heal back up but that can exacerbate the problem.  He may have some form of neuropathy and I suggested some baseline labs but he declined those at the present time.  He is to start an over-the-counter multivitamin and vitamin B12 to see if that helps.  If his symptoms continue we can do baseline labs and if necessary refer him to neurology.    2. Encounter to establish care with new doctor  Patient is establish care with me today.  Is told to follow-up in 1 year when we can do an annual exam and baseline labs.      Return in 1 year (on 8/2/2022), or if symptoms worsen or fail to improve, for annual exam.  21 minutes spent with patient.  Please note that this dictation was created using voice recognition software. I have made every reasonable attempt to correct obvious errors, but I expect that there are errors of grammar and possibly content that I did not discover before finalizing the " note.

## 2021-09-30 ENCOUNTER — HOSPITAL ENCOUNTER (OUTPATIENT)
Dept: RADIOLOGY | Facility: MEDICAL CENTER | Age: 36
End: 2021-09-30
Attending: ORTHOPAEDIC SURGERY
Payer: COMMERCIAL

## 2021-09-30 ENCOUNTER — HOSPITAL ENCOUNTER (EMERGENCY)
Facility: MEDICAL CENTER | Age: 36
End: 2021-09-30
Payer: COMMERCIAL

## 2021-09-30 DIAGNOSIS — M24.521 CONTRACTURE OF RIGHT ELBOW: ICD-10-CM

## 2021-09-30 PROCEDURE — 73200 CT UPPER EXTREMITY W/O DYE: CPT | Mod: RT

## 2021-10-21 ENCOUNTER — TELEMEDICINE (OUTPATIENT)
Dept: MEDICAL GROUP | Facility: PHYSICIAN GROUP | Age: 36
End: 2021-10-21
Payer: COMMERCIAL

## 2021-10-21 VITALS — BODY MASS INDEX: 32.47 KG/M2 | WEIGHT: 245 LBS | HEIGHT: 73 IN

## 2021-10-21 DIAGNOSIS — H60.501 ACUTE OTITIS EXTERNA OF RIGHT EAR, UNSPECIFIED TYPE: ICD-10-CM

## 2021-10-21 PROBLEM — Z76.89 ENCOUNTER TO ESTABLISH CARE WITH NEW DOCTOR: Status: RESOLVED | Noted: 2021-08-02 | Resolved: 2021-10-21

## 2021-10-21 PROCEDURE — 99213 OFFICE O/P EST LOW 20 MIN: CPT | Mod: 95 | Performed by: FAMILY MEDICINE

## 2021-10-21 RX ORDER — CIPROFLOXACIN AND DEXAMETHASONE 3; 1 MG/ML; MG/ML
4 SUSPENSION/ DROPS AURICULAR (OTIC) 2 TIMES DAILY
Qty: 7.5 ML | Refills: 0 | Status: SHIPPED | OUTPATIENT
Start: 2021-10-21

## 2021-10-21 RX ORDER — OXYCODONE HYDROCHLORIDE 5 MG/1
TABLET ORAL
COMMUNITY

## 2021-10-21 RX ORDER — OXYCODONE HYDROCHLORIDE 5 MG/1
TABLET ORAL
COMMUNITY
Start: 2021-10-14 | End: 2021-10-21

## 2021-10-21 RX ORDER — CEPHALEXIN 500 MG/1
CAPSULE ORAL
COMMUNITY
Start: 2021-10-14 | End: 2021-10-21

## 2021-10-21 RX ORDER — CEPHALEXIN 500 MG/1
CAPSULE ORAL
COMMUNITY

## 2021-10-21 NOTE — PROGRESS NOTES
Virtual Visit: Established Patient   This visit was conducted via Zoom using secure and encrypted videoconferencing technology.   The patient was in a private location in the state of [place].    The patient's identity was confirmed and verbal consent was obtained for this virtual visit.    Subjective:   CC:   Chief Complaint   Patient presents with   • Otalgia     right ear pain       Mak Miranda is a 36 y.o. male presenting for evaluation and management of:    Acute otitis externa of right ear  This is an acute problem.  Patient states he has problems with periodic infections of his ear.  He had some old Ciprodex but he would like a new prescription.  His older 1 is about 4 years old.  He currently can command as he just went through surgery and is preparing to move out of [place].  He says his wife looked in his ear and saw a little bit of drainage right around the entrance but the rest of the ear was dry and there was not any wax.  He states it does hurt if he pulls on the ear.        ROS   mo fever    Current medicines (including changes today)  Current Outpatient Medications   Medication Sig Dispense Refill   • cephALEXin (KEFLEX) 500 MG Cap cephalexin 500 mg capsule   Take 1 capsule twice a day by oral route for 7 days.     • oxyCODONE immediate-release (ROXICODONE) 5 MG Tab oxycodone 5 mg tablet   Take 1 tablet every 4 hours by oral route as needed for 7 days.     • ciprofloxacin/dexamethasone (CIPRODEX) 0.3-0.1 % Suspension Administer 4 Drops into the right ear 2 times a day. 7.5 mL 0   • meloxicam (MOBIC) 15 MG tablet Take 15 mg by mouth every day.     • ibuprofen (MOTRIN) 800 MG Tab Take 800 mg by mouth 1 time a day as needed.       No current facility-administered medications for this visit.       Patient Active Problem List    Diagnosis Date Noted   • Acute otitis externa of right ear 10/21/2021   • Loose body in elbow joint 08/02/2021   • Toe pain, bilateral 08/02/2021   • Left knee pain  "11/23/2016   • CLARE (generalized anxiety disorder) 10/04/2016   • Obsessive compulsive disorder 10/04/2016   • Generalized anxiety disorder 09/30/2016   • Chronic fatigue 07/29/2016   • Right elbow pain 10/06/2015   • GERD (gastroesophageal reflux disease) 09/30/2014   • Hyperlipidemia 09/30/2014   • Obesity (BMI 30.0-34.9) 09/30/2014        Objective:   Ht 1.854 m (6' 1\") Comment: per pt  Wt 111 kg (245 lb) Comment: per pt  BMI 32.32 kg/m²     Physical Exam:  Constitutional: Alert, no distress, well-groomed.  Skin: No rashes in visible areas.   ENMT: Lips pink without lesions,  moist mucous membranes. Phonation normal.  Respiratory: Unlabored respiratory effort, no cough or audible wheeze  Psych: Alert and oriented x3, normal affect and mood.     Assessment and Plan:   The following treatment plan was discussed:     1. Acute otitis externa of right ear, unspecified type  This is an acute problem.  Patient was told obviously I cannot examine the ear over the Internet.  He is on cephalexin from his surgery so that would cover any type of internal otitis.  I will go ahead and refill his Ciprodex for him to use but he is told if he does not get better he will need to come in for a face-to-face visit.  Other orders  - ciprofloxacin/dexamethasone (CIPRODEX) 0.3-0.1 % Suspension; Administer 4 Drops into the right ear 2 times a day.  Dispense: 7.5 mL; Refill: 0      Follow-up: No follow-ups on file.         "

## 2021-10-21 NOTE — ASSESSMENT & PLAN NOTE
This is an acute problem.  Patient states he has problems with periodic infections of his ear.  He had some old Ciprodex but he would like a new prescription.  His older 1 is about 4 years old.  He currently can command as he just went through surgery and is preparing to move out of state.  He says his wife looked in his ear and saw a little bit of drainage right around the entrance but the rest of the ear was dry and there was not any wax.  He states it does hurt if he pulls on the ear.

## 2022-10-11 NOTE — TELEPHONE ENCOUNTER
1. Caller Name: Pt                      Call Back Number: [phone]    2. Message: Pt called stating he had 9 stitches placed last night at ER. The discharge papers told him to get pain meds from his PCP. Pt is requesting rx for oxycodone.     3. Patient approves office to leave a detailed voicemail/MyChart message: yes       Yes

## 2025-07-03 NOTE — ASSESSMENT & PLAN NOTE
[facility] to transport. ETA 1339   Started a couple months ago with a URI. Waking up at night with cough, croupy cough and shortness of breath. Feels like he is not getting enough air in his lung. There is clear mucus on occasion. Tasting blood sometimes. There is constant congestion and pressure of sinus.   NyQuil helps slightly.  No wheezing. No GERD. No fever. + post nasal drainage.

## (undated) DEVICE — SUTURE 4-0 ETHILON FS-1 18 (36PK/BX)"

## (undated) DEVICE — BLADE SURGICAL #15 - (50/BX 3BX/CA)

## (undated) DEVICE — PACK LOWER EXTREMITY - (2/CA)

## (undated) DEVICE — SUTURE GENERAL

## (undated) DEVICE — GOWN WARMING STANDARD FLEX - (30/CA)

## (undated) DEVICE — SLEEVE VASO CALF MED - (10PR/CA)

## (undated) DEVICE — CHLORAPREP 26 ML APPLICATOR - ORANGE TINT(25/CA)

## (undated) DEVICE — LACTATED RINGERS INJ 1000 ML - (14EA/CA 60CA/PF)

## (undated) DEVICE — SUTURE 2-0 VICRYL PLUS SH - 27 INCH (36/BX)

## (undated) DEVICE — DEVICE SKIN CLOSURE SURGICAL ZIP 8 (10EA/PK)

## (undated) DEVICE — PAD PREP 24 X 48 CUFFED - (100/CA)

## (undated) DEVICE — ELECTRODE DUAL RETURN W/ CORD - (50/PK)

## (undated) DEVICE — TOWEL STOP TIMEOUT SAFETY FLAG (40EA/CA)

## (undated) DEVICE — SODIUM CHL IRRIGATION 0.9% 1000ML (12EA/CA)

## (undated) DEVICE — DRAPE LARGE 3 QUARTER - (20/CA)

## (undated) DEVICE — SUTURE 3-0 MONOCRYL PLUS PS-1 - 27 INCH (36/BX)